# Patient Record
Sex: FEMALE | Race: WHITE | URBAN - METROPOLITAN AREA
[De-identification: names, ages, dates, MRNs, and addresses within clinical notes are randomized per-mention and may not be internally consistent; named-entity substitution may affect disease eponyms.]

---

## 2017-01-17 ENCOUNTER — OFFICE VISIT (OUTPATIENT)
Dept: FAMILY MEDICINE | Facility: CLINIC | Age: 82
End: 2017-01-17

## 2017-01-17 VITALS
HEART RATE: 72 BPM | BODY MASS INDEX: 27.48 KG/M2 | OXYGEN SATURATION: 92 % | TEMPERATURE: 97.6 F | HEIGHT: 63 IN | DIASTOLIC BLOOD PRESSURE: 84 MMHG | WEIGHT: 155.12 LBS | SYSTOLIC BLOOD PRESSURE: 135 MMHG

## 2017-01-17 DIAGNOSIS — R10.84 ABDOMINAL PAIN, GENERALIZED: Primary | ICD-10-CM

## 2017-01-17 DIAGNOSIS — M25.562 CHRONIC PAIN OF BOTH KNEES: ICD-10-CM

## 2017-01-17 DIAGNOSIS — G89.29 CHRONIC PAIN OF BOTH KNEES: ICD-10-CM

## 2017-01-17 DIAGNOSIS — M25.561 CHRONIC PAIN OF BOTH KNEES: ICD-10-CM

## 2017-01-17 RX ORDER — NAPROXEN SODIUM 220 MG
TABLET ORAL 2 TIMES DAILY WITH MEALS
COMMUNITY
End: 2017-02-05 | Stop reason: SINTOL

## 2017-01-17 NOTE — MR AVS SNAPSHOT
"              After Visit Summary   2017    Rosina Arriaza    MRN: [id number]           Patient Information     Date Of Birth          1929        Visit Information        Provider Department      2017 3:40 PM Minh Oro MD [facility]         Follow-ups after your visit        Who to contact     Please call your clinic at [phone] to:    Ask questions about your health    Make or cancel appointments    Discuss your medicines    Learn about your test results    Speak to your doctor   If you have compliments or concerns about an experience at your clinic, or if you wish to file a complaint, please contact [facility] Patient Relations at [phone] or email us at [email or web address]         Additional Information About Your Visit        MyChart Information     HazelMail is an electronic gateway that provides easy, online access to your medical records. With HazelMail, you can request a clinic appointment, read your test results, renew a prescription or communicate with your care team.     To sign up for [employer] visit the website at [email or web address]   You will be asked to enter the access code listed below, as well as some personal information. Please follow the directions to create your username and password.     Your access code is: [email or web address]  Expires: 2017  4:58 PM     Your access code will  in 90 days. If you need help or a new code, please contact your [facility] or call [phone] for assistance.        Care EveryWhere ID     This is your Care EveryWhere ID. This could be used by other organizations to access your [place] medical records  [id number]        Your Vitals Were     Pulse Temperature Height BMI (Body Mass Index) Pulse Oximetry       72 97.6  F (36.4  C) 5' 2.99\" (160 cm) 27.49 kg/m2 92%        Blood Pressure from Last 3 Encounters:   17 135/84   16 165/76 "   11/01/16 145/84    Weight from Last 3 Encounters:   01/17/17 155 lb 1.9 oz (70.362 kg)   11/06/16 159 lb 9.6 oz (72.394 kg)   11/01/16 161 lb 1.3 oz (73.065 kg)              Today, you had the following     No orders found for display       Primary Care Provider Office Phone # Fax #    Minh Oro [phone]       [address] [address]        Thank you!     Thank you for choosing [facility]  for your care. Our goal is always to provide you with excellent care. Hearing back from our patients is one way we can continue to improve our services. Please take a few minutes to complete the written survey that you may receive in the mail after your visit with us. Thank you!             Your Updated Medication List - Protect others around you: Learn how to safely use, store and throw away your medicines at www.disposemymeds.org.          This list is accurate as of: 1/17/17  4:58 PM.  Always use your most recent med list.                   Brand Name Dispense Instructions for use    acetaminophen 500 MG tablet    TYLENOL    60 tablet    Take 2 tablets (1,000 mg) by mouth 3 times daily       ALEVE 220 MG tablet   Generic drug:  naproxen sodium      Take by mouth 2 times daily (with meals)       amoxicillin 500 MG capsule    AMOXIL    8 capsule    Take 4 capsules or 2,000 mg one hour prior to dental work.       aspirin 81 MG tablet     30 tablet    Take 1 tablet (81 mg) by mouth every morning       atenolol 25 MG tablet    TENORMIN    90 tablet    Take 1 tablet (25 mg) by mouth every morning       bisacodyl 10 MG Suppository    DULCOLAX    30 suppository    Place 1 suppository (10 mg) rectally daily as needed for constipation       cholecalciferol 1000 UNITS Tabs     30 tablet    Take 1,000 Units by mouth daily       clonazePAM 0.5 MG tablet    klonoPIN    20 tablet    Take 1.5 tablets (0.75 mg) by mouth At Bedtime       donepezil 10 MG tablet    ARICEPT     30 tablet    Take 1 tablet (10 mg) by mouth daily       lactobacillus rhamnosus (GG) capsule      Take 1 capsule by mouth daily       lidocaine 5 % Patch    LIDODERM    30 patch    Place 1-2 patches onto the skin every 24 hours Apply to left hip.  Patient must be patch free for 12 hours daily.  Patch on for 12 hours/off for 12 hours       memantine XR 28 MG 24 hr capsule    NAMENDA XR     Take 1 capsule (28 mg) by mouth every morning       multivitamin, therapeutic with minerals Tabs tablet     30 each    Take 1 tablet by mouth daily       polyethylene glycol Packet    MIRALAX/GLYCOLAX    7 packet    Take 17 g by mouth daily       sennosides 8.6 MG tablet    SENOKOT     Take 2 tablets by mouth 2 times daily as needed       TRAZODONE HCL PO      Take  mg, QHS as needed.       venlafaxine 75 MG tablet    EFFEXOR    60 tablet    Take 1 tablet (75 mg) by mouth 2 times daily

## 2017-01-17 NOTE — NURSING NOTE
"87 year old  Chief Complaint   Patient presents with     Abdominal Pain     x2 days. Pain scale was 7 when it's painful.       Blood pressure 135/84, pulse 72, temperature 97.6  F (36.4  C), height 5' 2.99\" (160 cm), weight 155 lb 1.9 oz (70.362 kg), SpO2 92 %. Body mass index is 27.49 kg/(m^2).  Patient Active Problem List   Diagnosis     Breast lump     Joint pain, knee     Preventative health care     Hypertension     Memory loss     Myalgia and myositis     Intertrochanteric fracture of right femur (H)     Trochanteric fracture (H)     DVT prophylaxis     Anemia due to blood loss, acute     Candidiasis of vulva and vagina     Physical deconditioning     Spinal stenosis     Closed fracture of left inferior pubic ramus (H)     Chronic idiopathic constipation     Persistent insomnia       Wt Readings from Last 2 Encounters:   01/17/17 155 lb 1.9 oz (70.362 kg)   11/06/16 159 lb 9.6 oz (72.394 kg)     BP Readings from Last 3 Encounters:   01/17/17 135/84   11/09/16 165/76   11/01/16 145/84         Current Outpatient Prescriptions   Medication     TRAZODONE HCL PO     naproxen sodium (ALEVE) 220 MG tablet     venlafaxine (EFFEXOR) 75 MG tablet     lactobacillus rhamnosus, GG, (CULTURELL) capsule     atenolol (TENORMIN) 25 MG tablet     lidocaine (LIDODERM) 5 % patch     bisacodyl (DULCOLAX) 10 MG suppository     polyethylene glycol (MIRALAX/GLYCOLAX) packet     donepezil (ARICEPT) 10 MG tablet     memantine XR (NAMENDA XR) 28 MG capsule     multivitamin, therapeutic with minerals (THERA-VIT-M) TABS     cholecalciferol 1000 UNITS TABS     aspirin 81 MG tablet     clonazePAM (KLONOPIN) 0.5 MG tablet     acetaminophen (TYLENOL) 500 MG tablet     amoxicillin (AMOXIL) 500 MG capsule     sennosides (SENOKOT) 8.6 MG tablet     No current facility-administered medications for this visit.       Social History   Substance Use Topics     Smoking status: Former Smoker     Quit date: 01/01/1965     Smokeless tobacco: Never Used "     Alcohol Use: Yes      Comment: Occasionally; 3 half sized beers per week       Health Maintenance Due   Topic Date Due     LAILA QUESTIONNAIRE 1 YEAR  02/10/1929     PHQ-9 Q1YR (NO INBASKET)  02/10/1929     ADVANCE DIRECTIVE PLANNING Q5 YRS (NO INBASKET)  02/09/1947     FALL RISK ASSESSMENT  02/09/1994       No results found for this basename: allyson Butts CMA  January 17, 2017 3:58 PM

## 2017-01-22 ENCOUNTER — TELEPHONE (OUTPATIENT)
Dept: NURSING | Facility: CLINIC | Age: 82
End: 2017-01-22

## 2017-01-22 NOTE — TELEPHONE ENCOUNTER
Call Type: Triage Call    Presenting Problem: Daughter calling with c/o that patient was seen  on  in clinic with abdominal pain, has history of  diverticulitis. PCP did not think this was diverticulitis flaring  up, but thought they just needed to increase the Miralax for a few  days, which they did. Scheduled to have abdominal xray repeated  tomorrow morning and daughter wants to try to schedlule an  appointment with the provider as well because patient is still  haveing abdominal pain.  Triage Note:  Guideline Title: Abdominal or Pelvic Pain  Recommended Disposition: See Provider within 72 Hours  Original Inclination: Wanted to speak with a nurse  Override Disposition:  Intended Action: See /Jesse Appt  Physician Contacted: No  All other situations ?  YES  Pain described as deep, boring, or  tearing ? NO  Swallowed alkali or button battery ? NO  Signs of dehydration ? NO  Mild lower abdominal pain OR discomfort occurring mid-menstrual cycle AND lasts  less than 48 hours ? NO  Swallowed sharp object (pin, needle, piece of glass) ? NO  Following a therapeutic OR elective  ? NO  Repeated episodes of abdominal discomfort AND no previous evaluation by provider ?  NO  New or worsening signs and symptoms that may indicate shock ? NO  Pregnant AND injury to abdomen ? NO  Pregnant AND abdominal pain/cramping OR back pain ? NO  Pregnant, less than 20 weeks gestation AND vaginal bleeding ? NO  Pregnant, more than 20 weeks gestation AND vaginal bleeding ? NO  Pregnant, gestation less than 20 weeks AND signs of labor ? NO  Pregnant, 20-37 weeks gestation AND signs of labor ? NO  Pregnant, gestation more than 37 weeks AND signs of labor ? NO  Pregnant AND heartburn ? NO  Female of childbearing age having unprotected sexual intercourse or inconsistently  using birth control AND absent, delayed or unusual menstrual period or abnormal  vaginal bleeding ? NO  Pain over lower abdomen AND new painful intercourse  (dyspareunia) ? NO  Intermittent abdominal pain/discomfort associated with stress ? NO  Feeling of fullness/bloating AND recently began taking bulk-forming laxatives;  ingestion of gas producing foods or following intake of food not eaten before ?  NO  Sudden onset of pain in testicle(s), groin, or lower abdomen AND testicle(s)  swollen or tender to touch ? NO  Episodes of abdominal discomfort (feeling fullness/bloating) AND occasional nausea  or vomiting, that are increasing in frequency ? NO  Increasing menstrual or mid-cycle lower abdominal or pelvic pain ? NO  Known or suspected food poisoning and symptoms do not improve after 24 hours of  home care ? NO  New onset painless bulge, mass or swelling at an old incision, in abdominal wall,  groin or scrotum ? NO  Following ingestion of toxic or caustic substance ? NO  Pain/discomfort over bladder AND urinary tract symptoms ? NO  Diarrhea lasting longer than 72 hours AND not improving with home care ? NO  Over 50 years of age AND new onset (first episode) unbearable back or abdominal  pain ? NO  Known abdominal aneurysm (swollen or ballooning aorta) AND sudden onset of  unbearable abdominal pain ? NO  Unable to reduce diagnosed hernia AND has severe pain, nausea/vomiting or any  fever ? NO  Abdominal pain and sickle cell disease, porphyria, or diabetes ? NO  Diagnosed hernia with new or increasing bulging/swelling AND continues to bulge  even when lying down ? NO  Intermittent lower abdominal or pelvic pain occurring throughout menstrual cycle  AND not previously evaluated ? NO  Unbearable abdominal/pelvic pain ? NO  Temperature of 101.5 F (38.6 C) or greater that has not responded to 24 hours of  home care measures ? NO  Loss of appetite for 3 or more days OR nausea for 7 or more days ? NO  Generalized abdominal pain that progresses to localized pain AND any of the  following: loss of appetite, vomiting starting after pain, any fever, OR unable  to carry out normal  "activities ? NO  Abdominal pain that has steadily worsened over hours OR has been continuous for 3  hours or more AND any of the following: loss of appetite, vomiting starting after  pain, any fever, OR unable to carry out normal activities ? NO  Sudden onset or recurring abdominal pain that radiates to upper back or shoulder  AND any of the following: loss of appetite, vomiting starting after pain, any  fever, OR unable to carry out normal activities ? NO  Pain in flank, low back, over bladder, groin or perineum AND sharp pain with  urination, or felt something \"pass\" with urination, or blood in urine ? NO  New onset of 3-4 episodes vomiting or diarrhea following mild abdominal cramping ?  NO  Food or foreign body feels stuck in esophagus. ? NO  Swallowed an object (other than a button battery, sharp object, or a large object  longer than 5 cm [2 inches] long or 2 cm [3/4 inch] wide) AND no symptoms. ? NO  Foul smelling or unusual vaginal discharge (such as change in color, odor, or  amount of vaginal discharge) ? NO  Jaundice (yellowish tint to skin or whites of eyes) that is worsening or not  previously evaluated. ? NO  GI bleeding, more than streaks or scant amount of blood or passing black tarry  stool(s) ? NO  Chest discomfort associated with shortness of breath, sweating, odd heartbeats or  different heart rate, nausea, vomiting, lightheadedness, or fainting lasting 5 or  more minutes now or within the last hour ? NO  Chest pain spreading to the shoulders, neck, jaw, in one or both arms, stomach or  back lasting 5 or more minutes now or within the last hour. Pain is NOT  associated with taking a deep breath or a productive cough, movement, or touch to  a localized area. ? NO  Unintentional weight loss of more than 10% body weight in 6 months or less ? NO  Pressure, fullness, squeezing sensation or pain anywhere in the chest lasting 5 or  more minutes now or within the last hour. Pain is NOT associated with " taking a  deep breath or a productive cough, movement, or touch to a localized area on the  chest. ? NO  Recurring episodes of hiccups or an episode that can't be stopped (more than 24  hours) ? NO  Abdominal discomfort (feeling of fullness/bloating, nausea) AND has started new  prescription, nonprescription or alternative medication OR change in dose or  frequency of usual medication ? NO  New or increasing mass, lump or localized swelling in abdomen ? NO  Abdominal pain, any blood in vomitus or stool, abdominal bloating, new fever or  other cautionary symptoms began after GI surgery or procedure and is lasting  longer than defined in provider specified discharge information. ? NO  Swallowed an object longer than 2 inches (5 cm) or wider than 3/4 inch (2 cm) wide  ? NO  Colicky pain followed by episodes of vomiting over 8 hours or more. ? NO  Generalized or localized pain that becomes severe with movement, standing up  straight or coughing ? NO  Injury to abdomen or pelvis ? NO  New or worsening breathing problems that have not been evaluated OR without a  treatment plan ? NO  Recent childbirth or miscarriage ? NO  New onset constant mild or aching lower abdominal pain not improving or worsening  after 24 hours of home care                      See Provider within 24 hours ?  NO  Any temperature elevation in an immunocompromised individual OR frail elderly with  signs of dehydration ? NO  Physician Instructions:  Care Advice: Do not take aspirin, ibuprofen, ketoprofen, naproxen, etc., or  other pain relieving medications until consulting with provider.  Call provider if symptoms do not improve or symptoms worsen after trying  home care measures.  CAUTIONS  SYMPTOM / CONDITION MANAGEMENT  Consider the use of an nonprescription antacid (such as Maalox, Mylanta,  Riopan, Gelusil or Gaviscon) or a histamine antagonist (such as Tagamet,  Zantac, Pepcid or Axid) as directed by the product label or a pharmacist  and if  not contraindicated by existing conditions.  Heartburn Relief (Dietary):  - Eat smaller, more frequent meals. Eat slowly  and chew each bite thoroughly. - Drink fluids between meals rather than  with meals - Avoid high fat, spicy or gas-producing foods - Limit  liquids/foods that are gastric irritants (fruit juices, caffeinated,  carbonated or alcoholic beverages, chocolate and dairy products). - Avoid  overeating and eat at least three hours before bedtime - Maintain a healthy  weight - Take a walk after meals  Heartburn Relief (Activity and life style): - Avoid bending over at the  waist or lying flat for several hours after eating. - Consider a provider  supervised weight loss program if overweight or obese. - Avoid clothing  that is tight around the abdomen and waist. - Sleeping on stacked pillows  or raising the head of bed on 6 inch blocks may help prevent reflux. -  Avoid lifting heavy objects, straining at stool, or doing isometric  exercises - Practice stress management techniques to deal with anxiety,  worry or fear  See a provider within 8 hours if you have had repeated occurrences  of  unexplained pain in shoulders, neck, jaw, in one or both arms, stomach or  back lasting more than a few minutes that has not been previously evaluated  and  you have risk factors for cardiovascular disease.

## 2017-01-31 DIAGNOSIS — R10.84 ABDOMINAL PAIN, GENERALIZED: Primary | ICD-10-CM

## 2017-02-01 ENCOUNTER — HOSPITAL ENCOUNTER (OUTPATIENT)
Dept: CT IMAGING | Facility: CLINIC | Age: 82
Discharge: HOME | End: 2017-02-01
Attending: FAMILY MEDICINE | Admitting: FAMILY MEDICINE
Payer: MEDICARE

## 2017-02-01 ENCOUNTER — OFFICE VISIT (OUTPATIENT)
Dept: FAMILY MEDICINE | Facility: CLINIC | Age: 82
End: 2017-02-01

## 2017-02-01 VITALS
SYSTOLIC BLOOD PRESSURE: 147 MMHG | BODY MASS INDEX: 27.11 KG/M2 | OXYGEN SATURATION: 98 % | WEIGHT: 153 LBS | DIASTOLIC BLOOD PRESSURE: 78 MMHG | TEMPERATURE: 97.5 F | HEART RATE: 62 BPM

## 2017-02-01 DIAGNOSIS — R41.3 MEMORY LOSS: ICD-10-CM

## 2017-02-01 DIAGNOSIS — K27.9 PEPTIC ULCER DISEASE: Primary | ICD-10-CM

## 2017-02-01 DIAGNOSIS — R10.84 ABDOMINAL PAIN, GENERALIZED: ICD-10-CM

## 2017-02-01 LAB — HEMOGLOBIN: 8.3 G/DL (ref 11.7–15.7)

## 2017-02-01 PROCEDURE — 25000125 ZZHC RX 250: Performed by: FAMILY MEDICINE

## 2017-02-01 PROCEDURE — 25500064 ZZH RX 255 OP 636: Performed by: FAMILY MEDICINE

## 2017-02-01 PROCEDURE — 74177 CT ABD & PELVIS W/CONTRAST: CPT

## 2017-02-01 RX ORDER — IOPAMIDOL 755 MG/ML
76 INJECTION, SOLUTION INTRAVASCULAR ONCE
Status: COMPLETED | OUTPATIENT
Start: 2017-02-01 | End: 2017-02-01

## 2017-02-01 RX ORDER — CIPROFLOXACIN 250 MG/1
250 TABLET, FILM COATED ORAL 2 TIMES DAILY
COMMUNITY
End: 2017-02-14

## 2017-02-01 RX ADMIN — SODIUM CHLORIDE 59 ML: 9 INJECTION, SOLUTION INTRAVENOUS at 13:30

## 2017-02-01 RX ADMIN — IOPAMIDOL 76 ML: 755 INJECTION, SOLUTION INTRAVENOUS at 13:29

## 2017-02-01 ASSESSMENT — PAIN SCALES - GENERAL: PAINLEVEL: MILD PAIN (2)

## 2017-02-01 NOTE — MR AVS SNAPSHOT
After Visit Summary   2/1/2017    Rosina Arriaza    MRN: [id number]           Patient Information     Date Of Birth          2/9/1929        Visit Information        Provider Department      2/1/2017 3:00 PM Minh Oro MD [facility]        Today's Diagnoses     Peptic ulcer disease    -  1        Follow-ups after your visit        Future tests that were ordered for you today     Open Future Orders        Priority Expected Expires Ordered    CT Abdomen Pelvis w Contrast Routine  1/31/2018 1/31/2017            Who to contact     Please call your clinic at [phone] to:    Ask questions about your health    Make or cancel appointments    Discuss your medicines    Learn about your test results    Speak to your doctor   If you have compliments or concerns about an experience at your clinic, or if you wish to file a complaint, please contact [facility] Patient Relations at [phone] or email us at [email or web address]         Additional Information About Your Visit        MyChart Information     [employer] gives you secure access to your electronic health record. If you see a primary care provider, you can also send messages to your care team and make appointments. If you have questions, please call your primary care clinic.  If you do not have a primary care provider, please call [phone] and they will assist you.      Exhbit is an electronic gateway that provides easy, online access to your medical records. With [employer], you can request a clinic appointment, read your test results, renew a prescription or communicate with your care team.     To access your existing account, please contact your [facility] or call [phone] for assistance.        Care EveryWhere ID     This is your Care EveryWhere ID. This could be used by other organizations to access your [place] medical records  [id number]        Your  Vitals Were     Pulse Temperature Pulse Oximetry             62 97.5  F (36.4  C) (Oral) 98%          Blood Pressure from Last 3 Encounters:   02/01/17 147/78   01/17/17 135/84   11/09/16 165/76    Weight from Last 3 Encounters:   02/01/17 153 lb (69.4 kg)   01/17/17 155 lb 1.9 oz (70.362 kg)   11/06/16 159 lb 9.6 oz (72.394 kg)              We Performed the Following     Hemoglobin (HGB) ([place])        Primary Care Provider Office Phone # Fax #    Minh Oro [phone]       [address] [address]        Thank you!     Thank you for choosing [facility]  for your care. Our goal is always to provide you with excellent care. Hearing back from our patients is one way we can continue to improve our services. Please take a few minutes to complete the written survey that you may receive in the mail after your visit with us. Thank you!             Your Updated Medication List - Protect others around you: Learn how to safely use, store and throw away your medicines at www.disposemymeds.org.          This list is accurate as of: 2/1/17  3:51 PM.  Always use your most recent med list.                   Brand Name Dispense Instructions for use    acetaminophen 500 MG tablet    TYLENOL    60 tablet    Take 2 tablets (1,000 mg) by mouth 3 times daily       ALEVE 220 MG tablet   Generic drug:  naproxen sodium      Take by mouth 2 times daily (with meals)       amoxicillin 500 MG capsule    AMOXIL    8 capsule    Take 4 capsules or 2,000 mg one hour prior to dental work.       aspirin 81 MG tablet     30 tablet    Take 1 tablet (81 mg) by mouth every morning       atenolol 25 MG tablet    TENORMIN    90 tablet    Take 1 tablet (25 mg) by mouth every morning       bisacodyl 10 MG Suppository    DULCOLAX    30 suppository    Place 1 suppository (10 mg) rectally daily as needed for constipation       cholecalciferol 1000 UNITS Tabs     30 tablet    Take 1,000  Units by mouth daily       ciprofloxacin 250 MG tablet    CIPRO     Take 250 mg by mouth 2 times daily       clonazePAM 0.5 MG tablet    klonoPIN    20 tablet    Take 1.5 tablets (0.75 mg) by mouth At Bedtime       donepezil 10 MG tablet    ARICEPT    30 tablet    Take 1 tablet (10 mg) by mouth daily       lactobacillus rhamnosus (GG) capsule      Take 1 capsule by mouth daily       lidocaine 5 % Patch    LIDODERM    30 patch    Place 1-2 patches onto the skin every 24 hours Apply to left hip.  Patient must be patch free for 12 hours daily.  Patch on for 12 hours/off for 12 hours       memantine XR 28 MG 24 hr capsule    NAMENDA XR     Take 1 capsule (28 mg) by mouth every morning       multivitamin, therapeutic with minerals Tabs tablet     30 each    Take 1 tablet by mouth daily       polyethylene glycol Packet    MIRALAX/GLYCOLAX    7 packet    Take 17 g by mouth daily       sennosides 8.6 MG tablet    SENOKOT     Take 2 tablets by mouth 2 times daily as needed       TRAZODONE HCL PO      Take  mg, QHS as needed.       venlafaxine 75 MG tablet    EFFEXOR    60 tablet    Take 1 tablet (75 mg) by mouth 2 times daily

## 2017-02-01 NOTE — NURSING NOTE
Chief Complaint   Patient presents with     Gastrointestinal Problem     CT results     87 year old      Blood pressure 147/78, pulse 62, temperature 97.5  F (36.4  C), temperature source Oral, weight 153 lb (69.4 kg), SpO2 98 %. Body mass index is 27.11 kg/(m^2).    Wt Readings from Last 2 Encounters:   02/01/17 153 lb (69.4 kg)   01/17/17 155 lb 1.9 oz (70.362 kg)     BP Readings from Last 3 Encounters:   02/01/17 147/78   01/17/17 135/84   11/09/16 165/76       Patient Active Problem List   Diagnosis     Breast lump     Joint pain, knee     Preventative health care     Hypertension     Memory loss     Myalgia and myositis     Intertrochanteric fracture of right femur (H)     Trochanteric fracture (H)     DVT prophylaxis     Anemia due to blood loss, acute     Candidiasis of vulva and vagina     Physical deconditioning     Spinal stenosis     Closed fracture of left inferior pubic ramus (H)     Chronic idiopathic constipation     Persistent insomnia       Current Outpatient Prescriptions   Medication Sig Dispense Refill     ciprofloxacin (CIPRO) 250 MG tablet Take 250 mg by mouth 2 times daily       TRAZODONE HCL PO Take  mg, QHS as needed.       naproxen sodium (ALEVE) 220 MG tablet Take by mouth 2 times daily (with meals)       venlafaxine (EFFEXOR) 75 MG tablet Take 1 tablet (75 mg) by mouth 2 times daily 60 tablet      lactobacillus rhamnosus, GG, (CULTURELL) capsule Take 1 capsule by mouth daily       atenolol (TENORMIN) 25 MG tablet Take 1 tablet (25 mg) by mouth every morning 90 tablet 3     lidocaine (LIDODERM) 5 % patch Place 1-2 patches onto the skin every 24 hours Apply to left hip.  Patient must be patch free for 12 hours daily.  Patch on for 12 hours/off for 12 hours 30 patch      bisacodyl (DULCOLAX) 10 MG suppository Place 1 suppository (10 mg) rectally daily as needed for constipation 30 suppository      polyethylene glycol (MIRALAX/GLYCOLAX) packet Take 17 g by mouth daily 7 packet       donepezil (ARICEPT) 10 MG tablet Take 1 tablet (10 mg) by mouth daily 30 tablet      memantine XR (NAMENDA XR) 28 MG capsule Take 1 capsule (28 mg) by mouth every morning       multivitamin, therapeutic with minerals (THERA-VIT-M) TABS Take 1 tablet by mouth daily 30 each 0     cholecalciferol 1000 UNITS TABS Take 1,000 Units by mouth daily 30 tablet      aspirin 81 MG tablet Take 1 tablet (81 mg) by mouth every morning 30 tablet      clonazePAM (KLONOPIN) 0.5 MG tablet Take 1.5 tablets (0.75 mg) by mouth At Bedtime 20 tablet 0     acetaminophen (TYLENOL) 500 MG tablet Take 2 tablets (1,000 mg) by mouth 3 times daily 60 tablet      amoxicillin (AMOXIL) 500 MG capsule Take 4 capsules or 2,000 mg one hour prior to dental work. 8 capsule 1     sennosides (SENOKOT) 8.6 MG tablet Take 2 tablets by mouth 2 times daily as needed          Social History   Substance Use Topics     Smoking status: Former Smoker     Quit date: 01/01/1965     Smokeless tobacco: Never Used     Alcohol Use: Yes      Comment: Occasionally; 3 half sized beers per week         Health Maintenance Due   Topic Date Due     LAILA QUESTIONNAIRE 1 YEAR  02/10/1929     PHQ-9 Q1YR (NO INBASKET)  02/10/1929     ADVANCE DIRECTIVE PLANNING Q5 YRS (NO INBASKET)  02/09/1947     FALL RISK ASSESSMENT  02/09/1994       ELOY DE LA GARZA CMA  February 1, 2017 3:12 PM

## 2017-02-05 NOTE — PROGRESS NOTES
CHIEF COMPLAINT:  Followup regarding abdominal pain.      HISTORY OF PRESENT ILLNESS:  Rosina is an 87-year-old accompanied by her daughter and later, by her son, both of whom were able to spend the visit with her.      She has been having ongoing abdominal pain with some subtle weight loss.  It has been hard for her to describe the pain.  No blood has been seen in her stool.  She has had some constipation.  Currently, she is using some MiraLax once daily along with some Colace for stool softening purposes.  Given her age, and the fact that she has some memory loss which makes it hard for her describe symptoms with great accuracy and the ongoing nature of the pain, we went ahead and got a CT scan with contrast earlier today.  She has some thickening of the latter half of the stomach, the duodenum and the first portion of the jejunum.  This can be consistent with some gastritis.  Malignancy cannot be completely ruled out.      During the course of our visit, on 5 separate occasions Rosina mentioned to me exactly the same thing and a couple times, her comments were within just a couple minutes of each other.  It is clear that her memory loss is accelerating greatly.      ACTIVE MEDICAL PROBLEMS:  Briefly reviewed.      CURRENT MEDICATIONS:     1. Reviewed.  Of note, she has been taking some naproxen, typically 1 tablet twice daily to help with musculoskeletal pain.  We will stop that.     2. She has also been doing aspirin and we are going to stop that as well.      OBJECTIVE:  Rosina is in no distress.  She looks a little pale.  She is not diaphoretic.  Extremely poor short-term memory.  No abdominal exam was done today.  I pulled up the CT scan report and we went over it in detail.        LABORATORY DATA:  Hemoglobin is pending.      ASSESSMENT AND PLAN:   1. Rosina is an 87-year-old in assisted living, accompanied by both her son and daughter, with ongoing abdominal pain.  CT scan as above.  We are going to  go ahead and treat with omeprazole 20 mg 1 tablet twice daily for 14 days.  We will see if this reduces her pain.  Simultaneously, we will stop the naproxen any aspirin.   2. She looks a bit anemic.  We will see where she is at.  This could be related to the above.  If things do not improve we will discuss options such as going to GI for an upper endoscopy.  Unfortunately, I do not think that she could adequately give consent at this point.  We will certainly work with her son and daughter regarding that.   3. If she does not improve, next steps need to be discussed such as getting a biopsy.  Again, given her age, advanced memory loss, it is not clear how aggressive to be but we will discuss this with the family.  Her daughter, Natalie, we will be in touch with me through [employer] and will give me updates.  We will certainly go from there.

## 2017-02-06 ENCOUNTER — TELEPHONE (OUTPATIENT)
Dept: FAMILY MEDICINE | Facility: CLINIC | Age: 82
End: 2017-02-06

## 2017-02-06 DIAGNOSIS — D64.9 LOW HEMOGLOBIN: ICD-10-CM

## 2017-02-06 DIAGNOSIS — D64.9 LOW HEMOGLOBIN: Primary | ICD-10-CM

## 2017-02-06 LAB — HEMOCCULT STL QL IA: POSITIVE

## 2017-02-06 PROCEDURE — 82274 ASSAY TEST FOR BLOOD FECAL: CPT | Performed by: FAMILY MEDICINE

## 2017-02-06 NOTE — TELEPHONE ENCOUNTER
"Received call from pt's daughter, Natalie - states pt had episode this AM - she was sitting on shower chair, slumped forward and appeared to lose consciousness for less than 5 seconds - this was reported to raymond by 24 hr HHA who was present. States she eased her to the floor - pt became alert and awake. HHA called for EMS help to get pt in bed. Pt was able to walk with assit to bed. She is alert, has poor appetite, fatigued. Pt with hgb 8.3 recently in clinic - has been taking prilosec bid with some success in lessening epigastric and abd pain. Raymond wondering what to do - she is heading over to pt's home.  Discussed episode with Dr Oro - he would like raymond to  FIT card to see if pt losing blood in stool. Also would like pt drink plenty of fluids, and to hold atenolol for now. If FIT card positive, he would want to discuss health care directives with family. Raymond states she has health care directive, she will bring in copy - she does not think it is in our chart, as it \"is several years old\". If she has another episode of loss of consciousness, she should go to ER for eval.  Discussed all of this with raymond - she agrees with plan.  She will call back prn concerns.  "

## 2017-02-11 NOTE — PROGRESS NOTES
"CHIEF COMPLAINT:  Two days of abdominal pain.      HISTORY OF PRESENT ILLNESS:  Rosina is an 88-year-old who is living in an assisted living facility and is accompanied by her daughter, Natalie, to today's visit.      She has a history with her bowels.  This has been going on for a while now.  She had tried some Culturelle and All-Bran and MiraLax.  Occasionally, she has some right lower quadrant pain.  Occasionally, she indicates that she feels \"constipated\".  She is getting some home health care and has been getting some help going to the bathroom and it has observed that she is having a daily bowel movement.      Two days ago, on Sunday, 01/15, around midnight she stated that she was quite constipated.  She developed some significant left lower quadrant pain and had some gas.  She asked for some meds and states that she had a real \"lousy night\" that evening.  By the next day (yesterday) she was feeling \"okay.\"  She had pineapple for a snack.  Then she developed some right lower quadrant pain.  She belched and felt better.  She has had some burning pain at times.  She has a history of diverticulosis.  She has not had any kind of imaging at this point.      ACTIVE MEDICAL PROBLEMS:  Reviewed.      CURRENT MEDICATIONS:     1. Reviewed.        Her geriatric psychiatrist, Dr. Godoy, has her on trazodone at night and venlafaxine (Effexor) 75 mg daily.  She is on donepezil and Namenda for her memory loss.  As an aside, her daughter mentions that her knee pain has flared at times but it seems like the abdominal pain has taken over.  We have done some intra-articular cortisone injections and it sounds like she gets some relief for a while.      OBJECTIVE:  Rosina is in no distress.  She is not diaphoretic.  Good eye contact.  It is clear that she has some memory loss but is conversant and very pleasant.  /84 with a pulse of 72.  Temperature is 97.6.  She is 5 feet 3 in height and weighs 155 with a BMI of 27.49.  O2 " sats are 92% on room air.  Her skin is warm and dry and pink, and I suspect her oxygen level is actually better than that.  Abdomen is not distended, nontender.  Bowel sounds are heard throughout.      ASSESSMENT AND PLAN:   1. Significant abdominal concerns including pain at times and concerns about constipation, although she has been observed having a bowel movement daily.  I am afraid that her memory loss is probably playing into this a little bit.   2. It is very possible that she is significantly constipated or has significant stool burden in her abdomen.  I will get a 2-view x-ray of her abdomen at their convenience.   3. Ongoing knee pain.  Before we do anymore intra-articular cortisone injections.  I would like to do an AP standing knee film to assess how much loss of joint space there is.  Her daughter, Natalie, is in complete agreement with the above plan.   4. Call with any problems or questions.  I will notify them when I get the results and we will proceed from there.

## 2017-02-14 ENCOUNTER — OFFICE VISIT (OUTPATIENT)
Dept: FAMILY MEDICINE | Facility: CLINIC | Age: 82
End: 2017-02-14

## 2017-02-14 VITALS
OXYGEN SATURATION: 96 % | DIASTOLIC BLOOD PRESSURE: 89 MMHG | BODY MASS INDEX: 26.58 KG/M2 | TEMPERATURE: 97.7 F | SYSTOLIC BLOOD PRESSURE: 145 MMHG | WEIGHT: 150 LBS | HEART RATE: 106 BPM

## 2017-02-14 DIAGNOSIS — R41.3 MEMORY LOSS: ICD-10-CM

## 2017-02-14 DIAGNOSIS — K29.00 ACUTE GASTRITIS, PRESENCE OF BLEEDING UNSPECIFIED, UNSPECIFIED GASTRITIS TYPE: Primary | ICD-10-CM

## 2017-02-14 DIAGNOSIS — D62 ANEMIA DUE TO BLOOD LOSS, ACUTE: ICD-10-CM

## 2017-02-14 LAB — HEMOGLOBIN: 9.5 G/DL (ref 11.7–15.7)

## 2017-02-14 ASSESSMENT — PAIN SCALES - GENERAL: PAINLEVEL: NO PAIN (0)

## 2017-02-14 NOTE — MR AVS SNAPSHOT
After Visit Summary   2/14/2017    Rosina Arriaza    MRN: [id number]           Patient Information     Date Of Birth          2/9/1929        Visit Information        Provider Department      2/14/2017 3:40 PM Minh Oro MD [facility]        Today's Diagnoses     Anemia due to blood loss, acute    -  1       Follow-ups after your visit        Who to contact     Please call your clinic at [phone] to:    Ask questions about your health    Make or cancel appointments    Discuss your medicines    Learn about your test results    Speak to your doctor   If you have compliments or concerns about an experience at your clinic, or if you wish to file a complaint, please contact [facility] Patient Relations at [phone] or email us at [email or web address]         Additional Information About Your Visit        MyChart Information     [employer] gives you secure access to your electronic health record. If you see a primary care provider, you can also send messages to your care team and make appointments. If you have questions, please call your primary care clinic.  If you do not have a primary care provider, please call [phone] and they will assist you.      [employer] is an electronic gateway that provides easy, online access to your medical records. With [employer], you can request a clinic appointment, read your test results, renew a prescription or communicate with your care team.     To access your existing account, please contact your [facility] or call [phone] for assistance.        Care EveryWhere ID     This is your Care EveryWhere ID. This could be used by other organizations to access your [place] medical records  [id number]        Your Vitals Were     Pulse Temperature Pulse Oximetry BMI (Body Mass Index)          106 97.7  F (36.5  C) (Oral) 96% 26.58 kg/m2         Blood Pressure from Last 3 Encounters:    02/14/17 145/89   02/01/17 147/78   01/17/17 135/84    Weight from Last 3 Encounters:   02/14/17 150 lb (68 kg)   02/01/17 153 lb (69.4 kg)   01/17/17 155 lb 1.9 oz (70.4 kg)              We Performed the Following     Hemoglobin (HGB) ([place])          Today's Medication Changes          These changes are accurate as of: 2/14/17  4:10 PM.  If you have any questions, ask your nurse or doctor.               Stop taking these medicines if you haven't already. Please contact your care team if you have questions.     amoxicillin 500 MG capsule   Commonly known as:  AMOXIL   Stopped by:  Minh Oro MD           atenolol 25 MG tablet   Commonly known as:  TENORMIN   Stopped by:  Minh Oro MD           ciprofloxacin 250 MG tablet   Commonly known as:  CIPRO   Stopped by:  Minh Oro MD                    Primary Care Provider Office Phone # Fax #    Minh Oro -[phone]       [place] [address]        Thank you!     Thank you for choosing [facility]  for your care. Our goal is always to provide you with excellent care. Hearing back from our patients is one way we can continue to improve our services. Please take a few minutes to complete the written survey that you may receive in the mail after your visit with us. Thank you!             Your Updated Medication List - Protect others around you: Learn how to safely use, store and throw away your medicines at www.disposemymeds.org.          This list is accurate as of: 2/14/17  4:10 PM.  Always use your most recent med list.                   Brand Name Dispense Instructions for use    acetaminophen 500 MG tablet    TYLENOL    60 tablet    Take 2 tablets (1,000 mg) by mouth 3 times daily       bisacodyl 10 MG Suppository    DULCOLAX    30 suppository    Place 1 suppository (10 mg) rectally daily as needed for constipation       cholecalciferol 1000 UNITS Tabs     30  tablet    Take 1,000 Units by mouth daily       clonazePAM 0.5 MG tablet    klonoPIN    20 tablet    Take 1.5 tablets (0.75 mg) by mouth At Bedtime       donepezil 10 MG tablet    ARICEPT    30 tablet    Take 1 tablet (10 mg) by mouth daily       lactobacillus rhamnosus (GG) capsule      Take 1 capsule by mouth daily       lidocaine 5 % Patch    LIDODERM    30 patch    Place 1-2 patches onto the skin every 24 hours Apply to left hip.  Patient must be patch free for 12 hours daily.  Patch on for 12 hours/off for 12 hours       memantine XR 28 MG 24 hr capsule    NAMENDA XR     Take 1 capsule (28 mg) by mouth every morning       multivitamin, therapeutic with minerals Tabs tablet     30 each    Take 1 tablet by mouth daily       polyethylene glycol Packet    MIRALAX/GLYCOLAX    7 packet    Take 17 g by mouth daily       sennosides 8.6 MG tablet    SENOKOT     Take 2 tablets by mouth 2 times daily as needed       TRAZODONE HCL PO      Take  mg, QHS as needed.       venlafaxine 75 MG tablet    EFFEXOR    60 tablet    Take 1 tablet (75 mg) by mouth 2 times daily

## 2017-02-14 NOTE — NURSING NOTE
Chief Complaint   Patient presents with     Follow Up For     low hemoglobin     88 year old      Blood pressure 145/89, pulse 106, temperature 97.7  F (36.5  C), temperature source Oral, weight 150 lb (68 kg), SpO2 96 %. Body mass index is 26.58 kg/(m^2).    Wt Readings from Last 2 Encounters:   02/14/17 150 lb (68 kg)   02/01/17 153 lb (69.4 kg)     BP Readings from Last 3 Encounters:   02/14/17 145/89   02/01/17 147/78   01/17/17 135/84       Patient Active Problem List   Diagnosis     Breast lump     Joint pain, knee     Preventative health care     Hypertension     Memory loss     Myalgia and myositis     Intertrochanteric fracture of right femur (H)     Trochanteric fracture (H)     DVT prophylaxis     Anemia due to blood loss, acute     Candidiasis of vulva and vagina     Physical deconditioning     Spinal stenosis     Closed fracture of left inferior pubic ramus (H)     Chronic idiopathic constipation     Persistent insomnia       Current Outpatient Prescriptions   Medication Sig Dispense Refill     TRAZODONE HCL PO Take  mg, QHS as needed.       venlafaxine (EFFEXOR) 75 MG tablet Take 1 tablet (75 mg) by mouth 2 times daily 60 tablet      lactobacillus rhamnosus, GG, (CULTURELL) capsule Take 1 capsule by mouth daily       lidocaine (LIDODERM) 5 % patch Place 1-2 patches onto the skin every 24 hours Apply to left hip.  Patient must be patch free for 12 hours daily.  Patch on for 12 hours/off for 12 hours 30 patch      bisacodyl (DULCOLAX) 10 MG suppository Place 1 suppository (10 mg) rectally daily as needed for constipation 30 suppository      polyethylene glycol (MIRALAX/GLYCOLAX) packet Take 17 g by mouth daily 7 packet      donepezil (ARICEPT) 10 MG tablet Take 1 tablet (10 mg) by mouth daily 30 tablet      memantine XR (NAMENDA XR) 28 MG capsule Take 1 capsule (28 mg) by mouth every morning       multivitamin, therapeutic with minerals (THERA-VIT-M) TABS Take 1 tablet by mouth daily 30 each 0      cholecalciferol 1000 UNITS TABS Take 1,000 Units by mouth daily 30 tablet      clonazePAM (KLONOPIN) 0.5 MG tablet Take 1.5 tablets (0.75 mg) by mouth At Bedtime 20 tablet 0     acetaminophen (TYLENOL) 500 MG tablet Take 2 tablets (1,000 mg) by mouth 3 times daily 60 tablet      sennosides (SENOKOT) 8.6 MG tablet Take 2 tablets by mouth 2 times daily as needed          Social History   Substance Use Topics     Smoking status: Former Smoker     Quit date: 1/1/1965     Smokeless tobacco: Never Used     Alcohol use Yes      Comment: Occasionally; 3 half sized beers per week         Health Maintenance Due   Topic Date Due     LAILA QUESTIONNAIRE 1 YEAR  02/10/1929     PHQ-9 Q1YR (NO INBASKET)  02/10/1929     ADVANCE DIRECTIVE PLANNING Q5 YRS (NO INBASKET)  02/09/1947     FALL RISK ASSESSMENT  02/09/1994       ELOY DE LA GARZA CMA  February 14, 2017 3:44 PM

## 2017-02-20 NOTE — PROGRESS NOTES
CHIEF COMPLAINT:  Followup for anemia.      HISTORY OF PRESENT ILLNESS:  Rosina is an 88-year-old accompanied by her daughter, Natalie, to today's visit.  Her son, Dar, could not be here in person, but he was on speaker phone on my phone.      I asked Rosina to come back approximately 2 weeks after having a good conversation with the 3 of them about her situation.  She was having a great deal of abdominal pain which was difficult for her to identify.  CT scan revealed some changes in the distal stomach and proximal duodenum.  It was suspicious for gastritis, but cancer could not be ruled out.  Therefore, she stopped taking naproxen and aspirin, started taking omeprazole 20 mg twice daily and they have been very consistent with this.      Of note, her FIT card on 2/06 was positive for blood.      I am very happy to report that her abdominal pain is completely resolved.  Her appetite has improved.  Her memory seems to have improved.  She is feeling so much better.  Both family members echo that.  Indeed, her hemoglobin on 02/01 was 8.3 and today it is up to 9.5.  I think this is an excellent sign.      ACTIVE MEDICAL PROBLEMS/CURRENT MEDICATIONS:  Reviewed.      OBJECTIVE:  Rosina is in no distress.  Whereas last time, she mentioned that she was having abdominal pain at least 5 times during the course of our visit and did not have any memory of the previous time that she asked, that simply did not happen this time.  She seemed much more sharp.  She was lucid.  She was answering questions appropriately.  /89 with a pulse of 106.  Temperature is 97.7.  She weighs 150 pounds, down 3 pounds from 2 weeks ago.  O2 sats are 96% on room air.  BMI 26.58.  Her color looks a little bit better as well.  She was looking quite pale last time.      ASSESSMENT AND PLAN:   1.  Probable gastritis with bleeding.  Likely exacerbated by the fact that she was taking daily baby aspirin and then using naproxen for pain.  She  stopped using both of those medications and we put her on a fairly high-dose PPI medication.   2.  I am going to have her continue with omeprazole 20 mg once daily (rather than twice daily) for the next month.  I would like to see her back in 1 more month.  At that time, we will check a hemoglobin again.  If it continues to rise, we will know we are in the right direction.  We can check another FIT card at some point, and I suspect that will be negative for any blood.  At age 88, in the context of some memory loss, I would like to keep interventional procedures down to a minimum.  Family agrees.  Call with any problems or questions.

## 2017-03-16 DIAGNOSIS — R32 URINARY INCONTINENCE: Primary | ICD-10-CM

## 2017-03-16 RX ORDER — SOLIFENACIN SUCCINATE 5 MG/1
5 TABLET, FILM COATED ORAL DAILY
Qty: 30 TABLET | Refills: 1 | Status: SHIPPED | OUTPATIENT
Start: 2017-03-16 | End: 2017-05-16 | Stop reason: DRUGHIGH

## 2017-04-11 ENCOUNTER — OFFICE VISIT (OUTPATIENT)
Dept: FAMILY MEDICINE | Facility: CLINIC | Age: 82
End: 2017-04-11

## 2017-04-11 VITALS
DIASTOLIC BLOOD PRESSURE: 88 MMHG | HEIGHT: 63 IN | BODY MASS INDEX: 26.23 KG/M2 | OXYGEN SATURATION: 94 % | SYSTOLIC BLOOD PRESSURE: 151 MMHG | WEIGHT: 148.04 LBS | HEART RATE: 93 BPM

## 2017-04-11 DIAGNOSIS — D62 ANEMIA DUE TO BLOOD LOSS, ACUTE: ICD-10-CM

## 2017-04-11 DIAGNOSIS — N32.89 BLADDER SPASMS: ICD-10-CM

## 2017-04-11 DIAGNOSIS — R41.3 MEMORY LOSS: Primary | ICD-10-CM

## 2017-04-11 LAB
ERYTHROCYTE [DISTWIDTH] IN BLOOD BY AUTOMATED COUNT: 14.8 % (ref 10–15)
HCT VFR BLD AUTO: 33.6 % (ref 35–47)
HEMOGLOBIN: 8.6 G/DL (ref 11.7–15.7)
HGB BLD-MCNC: 10.1 G/DL (ref 11.7–15.7)
MCH RBC QN AUTO: 27.8 PG (ref 26.5–33)
MCHC RBC AUTO-ENTMCNC: 30.1 G/DL (ref 31.5–36.5)
MCV RBC AUTO: 93 FL (ref 78–100)
PLATELET # BLD AUTO: 309 10E9/L (ref 150–450)
RBC # BLD AUTO: 3.63 10E12/L (ref 3.8–5.2)
WBC # BLD AUTO: 8.2 10E9/L (ref 4–11)

## 2017-04-11 RX ORDER — SOLIFENACIN SUCCINATE 10 MG/1
10 TABLET, FILM COATED ORAL DAILY
Qty: 30 TABLET | Refills: 4 | Status: SHIPPED | OUTPATIENT
Start: 2017-04-11 | End: 2017-05-16

## 2017-04-11 ASSESSMENT — PAIN SCALES - GENERAL: PAINLEVEL: NO PAIN (0)

## 2017-04-11 NOTE — MR AVS SNAPSHOT
After Visit Summary   4/11/2017    Rosina Arriaza    MRN: [id number]           Patient Information     Date Of Birth          2/9/1929        Visit Information        Provider Department      4/11/2017 3:40 PM Minh Oro MD [facility]        Today's Diagnoses     Memory loss    -  1    Anemia due to blood loss, acute        Bladder spasms           Follow-ups after your visit        Follow-up notes from your care team     Return if symptoms worsen or fail to improve.      Who to contact     Please call your clinic at [phone] to:    Ask questions about your health    Make or cancel appointments    Discuss your medicines    Learn about your test results    Speak to your doctor   If you have compliments or concerns about an experience at your clinic, or if you wish to file a complaint, please contact [facility] Patient Relations at [phone] or email us at [email or web address]         Additional Information About Your Visit        MyChart Information     [email or web address] gives you secure access to your electronic health record. If you see a primary care provider, you can also send messages to your care team and make appointments. If you have questions, please call your primary care clinic.  If you do not have a primary care provider, please call [phone] and they will assist you.      [employer] is an electronic gateway that provides easy, online access to your medical records. With [employer], you can request a clinic appointment, read your test results, renew a prescription or communicate with your care team.     To access your existing account, please contact your [facility] or call [phone] for assistance.        Care EveryWhere ID     This is your Care EveryWhere ID. This could be used by other organizations to access your [facility] medical records  [id number]        Your Vitals Were     Pulse Height Pulse  "Oximetry BMI (Body Mass Index)          93 5' 2.99\" (160 cm) 94% 26.23 kg/m2         Blood Pressure from Last 3 Encounters:   04/11/17 151/88   02/14/17 145/89   02/01/17 147/78    Weight from Last 3 Encounters:   04/11/17 148 lb 0.6 oz (67.2 kg)   02/14/17 150 lb (68 kg)   02/01/17 153 lb (69.4 kg)              We Performed the Following     CBC with platelets     Ferritin     Hemoglobin (HGB) ([place])     Iron and iron binding capacity          Today's Medication Changes          These changes are accurate as of: 4/11/17 11:59 PM.  If you have any questions, ask your nurse or doctor.               These medicines have changed or have updated prescriptions.        Dose/Directions    lidocaine 5 % Patch   Commonly known as:  LIDODERM   This may have changed:    - when to take this  - reasons to take this  - additional instructions   Used for:  Pubic ramus fracture, left, closed, initial encounter (H)        Dose:  1-2 patch   Place 1-2 patches onto the skin every 24 hours Apply to left hip.  Patient must be patch free for 12 hours daily.  Patch on for 12 hours/off for 12 hours   Quantity:  30 patch   Refills:  0       * solifenacin 5 MG tablet   Commonly known as:  VESICARE   This may have changed:  how much to take   Used for:  Urinary incontinence   Changed by:  Minh Oro MD        Dose:  5 mg   Take 1 tablet (5 mg) by mouth daily   Quantity:  30 tablet   Refills:  1       * solifenacin 10 MG tablet   Commonly known as:  VESICARE   This may have changed:  You were already taking a medication with the same name, and this prescription was added. Make sure you understand how and when to take each.   Used for:  Bladder spasms   Changed by:  Minh Oro MD        Dose:  10 mg   Take 1 tablet (10 mg) by mouth daily   Quantity:  30 tablet   Refills:  4       * Notice:  This list has 2 medication(s) that are the same as other medications prescribed for you. Read the directions carefully, and ask " your doctor or other care provider to review them with you.         Where to get your medicines      These medications were sent to [facility] [address] [facility] [address] [place] [address] AT [facility]  [address] [facility] [address]     Phone:  [phone]     solifenacin 10 MG tablet                Primary Care Provider Office Phone # Fax #    Minh Oro [phone]       [facility] [address]  [facility] [address]        Thank you!     Thank you for choosing [facility]  for your care. Our goal is always to provide you with excellent care. Hearing back from our patients is one way we can continue to improve our services. Please take a few minutes to complete the written survey that you may receive in the mail after your visit with us. Thank you!             Your Updated Medication List - Protect others around you: Learn how to safely use, store and throw away your medicines at www.disposemymeds.org.          This list is accurate as of: 4/11/17 11:59 PM.  Always use your most recent med list.                   Brand Name Dispense Instructions for use    acetaminophen 500 MG tablet    TYLENOL    60 tablet    Take 2 tablets (1,000 mg) by mouth 3 times daily       cholecalciferol 1000 UNITS Tabs     30 tablet    Take 1,000 Units by mouth daily       clonazePAM 0.5 MG tablet    klonoPIN    20 tablet    Take 1.5 tablets (0.75 mg) by mouth At Bedtime       COLACE PO      Take 100 mg by mouth       donepezil 10 MG tablet    ARICEPT    30 tablet    Take 1 tablet (10 mg) by mouth daily       lactobacillus rhamnosus (GG) capsule      Take 1 capsule by mouth daily       lidocaine 5 % Patch    LIDODERM    30 patch    Place 1-2 patches onto the skin every 24 hours Apply to left hip.  Patient must be patch free for 12 hours daily.  Patch on for 12 hours/off for 12 hours       memantine XR 28 MG 24 hr capsule    NAMENDA XR     Take 1 capsule (28 mg) by mouth every  morning       multivitamin, therapeutic with minerals Tabs tablet     30 each    Take 1 tablet by mouth daily       polyethylene glycol Packet    MIRALAX/GLYCOLAX    7 packet    Take 17 g by mouth daily       * solifenacin 5 MG tablet    VESICARE    30 tablet    Take 1 tablet (5 mg) by mouth daily       * solifenacin 10 MG tablet    VESICARE    30 tablet    Take 1 tablet (10 mg) by mouth daily       TRAZODONE HCL PO      Take  mg, QHS as needed.       venlafaxine 75 MG tablet    EFFEXOR    60 tablet    Take 1 tablet (75 mg) by mouth 2 times daily       * Notice:  This list has 2 medication(s) that are the same as other medications prescribed for you. Read the directions carefully, and ask your doctor or other care provider to review them with you.

## 2017-04-11 NOTE — NURSING NOTE
"88 year old  Chief Complaint   Patient presents with     Follow Up For     a new medication and using walker.     Blood Draw     check Hgb.     Medication Request       Blood pressure 151/88, pulse 93, height 5' 2.99\" (160 cm), weight 148 lb 0.6 oz (67.2 kg), SpO2 94 %. Body mass index is 26.23 kg/(m^2).  Patient Active Problem List   Diagnosis     Breast lump     Joint pain, knee     Preventative health care     Hypertension     Memory loss     Myalgia and myositis     Intertrochanteric fracture of right femur (H)     Trochanteric fracture (H)     DVT prophylaxis     Anemia due to blood loss, acute     Candidiasis of vulva and vagina     Physical deconditioning     Spinal stenosis     Closed fracture of left inferior pubic ramus (H)     Chronic idiopathic constipation     Persistent insomnia       Wt Readings from Last 2 Encounters:   04/11/17 148 lb 0.6 oz (67.2 kg)   02/14/17 150 lb (68 kg)     BP Readings from Last 3 Encounters:   04/11/17 151/88   02/14/17 145/89   02/01/17 147/78         Current Outpatient Prescriptions   Medication     Docusate Sodium (COLACE PO)     solifenacin (VESICARE) 5 MG tablet     TRAZODONE HCL PO     venlafaxine (EFFEXOR) 75 MG tablet     lactobacillus rhamnosus, GG, (CULTURELL) capsule     lidocaine (LIDODERM) 5 % patch     polyethylene glycol (MIRALAX/GLYCOLAX) packet     donepezil (ARICEPT) 10 MG tablet     memantine XR (NAMENDA XR) 28 MG capsule     multivitamin, therapeutic with minerals (THERA-VIT-M) TABS     cholecalciferol 1000 UNITS TABS     clonazePAM (KLONOPIN) 0.5 MG tablet     acetaminophen (TYLENOL) 500 MG tablet     No current facility-administered medications for this visit.        Social History   Substance Use Topics     Smoking status: Former Smoker     Quit date: 1/1/1965     Smokeless tobacco: Never Used     Alcohol use Yes      Comment: Occasionally; 3 half sized beers per week       Health Maintenance Due   Topic Date Due     LAILA QUESTIONNAIRE 1 YEAR  " 02/10/1929     PHQ-9 Q1YR (NO INBASKET)  02/10/1929     ADVANCE DIRECTIVE PLANNING Q5 YRS (NO INBASKET)  02/09/1947       No results found for: GYPSY Butts CMA  April 11, 2017 3:45 PM

## 2017-04-11 NOTE — PROGRESS NOTES
CHIEF COMPLAINT:  Followup regarding blood loss, pelvic fracture and gastritis.      HISTORY OF PRESENT ILLNESS:  Rosina is an 88-year-old accompanied by both her daughter, Natalie, and son, Dar.  I have been in frequent contact with Natalie through [employer].      Her mom has been waking up in the night to void, up to 4 times a night, and this has been documented by the aide who is with her.  Therefore, Natalie checked with both her mom's psychiatrist (Dr. Godoy) and me about possibly adjusting her trazodone and then we started looking at other antispasmodic medications.  Ultimately, we settled on VESIcare.  She has been doing 5 mg at night and it clearly seemed to help.  At one point a little over a week ago, she had some hallucinations.  She has not had any subsequent hallucinations.  In addition, we have even increased her medication from 5 mg to 10 mg at night and she has been doing this now for the last 4 nights.  No hallucinations have occurred.  Though Rosina is not that conscious of how often she is getting up or not, it has been reported that she is doing quite well with fewer nighttime risings to void.  This is certainly reassuring.      She continues to be pain free.  No epigastric discomfort.  She truly has not complained about anything.  She has been on omeprazole 20 mg daily for almost 2 months now.  Her last pill ran out yesterday.  She has had no black tarry stools or blood in the stool.  Truly no complaints of pain.      She herself brings up the fact that her memory has not been as good as it had been and this has been going on for at least 2 months.  Her daughter, Natalie, mentioned to me later through a phone call that her mom has said that periodically over the years.  Clearly, her memory loss waxes and wanes.      ACTIVE MEDICAL PROBLEMS:  Reviewed.      CURRENT MEDICATIONS:  Briefly reviewed.      OBJECTIVE:  Rosina is in absolutely no distress.  Her color looks quite good.   She is upbeat and was  certainly alert and oriented to person and place.  Blood pressure is a bit high at 151/88.  This is consistent with what it has been before.  Pulse 93 and regular.  She is 5 feet 3 inches and weighs 148 with just a 2 pound weight loss in the last 2 months.  O2 sats are 94% on room air.  BMI 26.23.  Hemoglobin was obtained and in the office it came back at 8.6.  This is somewhat disappointing as it had gone from 8.3 in early February to 9.5 two weeks later.  Unfortunately, we realized later that this was a point of care error as I sent out a CBC and that showed that her hemoglobin was actually 10.1.  Nevertheless, I am still checking iron studies and a ferritin level.      ASSESSMENT AND PLAN:   1.  History of bladder spasms, markedly improved on VESIcare.  Doing well with 10 mg nightly.  Just one single episode of hallucinations occurring.  This can be a side effect of the medication, but given that this just happened once, we will stay the course with all of this.   2.  History of significant abdominal discomfort, now completely resolved having taken omeprazole 20 mg once daily for nearly 2 months.   3.  History of anemia, likely secondary to gastric bleed.  That has completely resolved going from a low of 8.3 on 02/01/2017 up to 10.1 today.  Hopefully, this will continue to rise with time.   4.  Ongoing history of memory loss, somewhat unchanged.   5.  We had a good conversation about the use of her walker.  She had gone to physical therapy, and strengthened things, then fell and broke her pelvis.  She recovered and is using a walker.  She really hates using it but we all suggested how important it would be for her to continue to use it.  Reluctantly, she agrees to do so.  Call with any problems or questions.  I look forward to seeing her back in approximately 2-3 months.      Total time spent with Natalie Almaguer and Dar over 25 minutes, more than 50% of that time was spent in care coordination and counseling.

## 2017-04-12 LAB — FERRITIN SERPL-MCNC: 15 NG/ML (ref 8–252)

## 2017-04-13 LAB
IRON SATN MFR SERPL: NORMAL % (ref 15–50)
IRON SERPL-MCNC: NORMAL UG/DL (ref 35–180)
TIBC SERPL-MCNC: NORMAL UG/DL (ref 240–430)

## 2017-05-16 DIAGNOSIS — N32.89 BLADDER SPASMS: ICD-10-CM

## 2017-05-16 RX ORDER — SOLIFENACIN SUCCINATE 10 MG/1
10 TABLET, FILM COATED ORAL DAILY
Qty: 90 TABLET | Refills: 3 | Status: SHIPPED | OUTPATIENT
Start: 2017-05-16

## 2017-05-19 ENCOUNTER — DOCUMENTATION ONLY (OUTPATIENT)
Dept: OTHER | Facility: CLINIC | Age: 82
End: 2017-05-19

## 2017-05-19 DIAGNOSIS — Z71.89 ADVANCED DIRECTIVES, COUNSELING/DISCUSSION: Chronic | ICD-10-CM

## 2017-06-04 ENCOUNTER — NURSE TRIAGE (OUTPATIENT)
Dept: NURSING | Facility: CLINIC | Age: 82
End: 2017-06-04

## 2017-06-04 NOTE — TELEPHONE ENCOUNTER
Reason for Disposition    Requesting regular office appointment    Additional Information    Negative: [1] Caller is not with the adult (patient) AND [2] reporting urgent symptoms    Negative: Lab result questions    Negative: Medication questions    Negative: Caller cannot be reached by phone    Negative: Caller has already spoken to PCP or another triager    Negative: RN needs further essential information from caller in order to complete triage    Protocols used: INFORMATION ONLY [facility]

## 2017-06-20 ENCOUNTER — OFFICE VISIT (OUTPATIENT)
Dept: FAMILY MEDICINE | Facility: CLINIC | Age: 82
End: 2017-06-20

## 2017-06-20 VITALS
DIASTOLIC BLOOD PRESSURE: 86 MMHG | TEMPERATURE: 97.8 F | HEART RATE: 85 BPM | SYSTOLIC BLOOD PRESSURE: 163 MMHG | OXYGEN SATURATION: 94 %

## 2017-06-20 DIAGNOSIS — S80.811A ABRASION, RIGHT LOWER LEG, INITIAL ENCOUNTER: Primary | ICD-10-CM

## 2017-06-20 DIAGNOSIS — S00.83XA CONTUSION OF FACE, SCALP AND NECK, INITIAL ENCOUNTER: ICD-10-CM
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
## 2017-06-20 DIAGNOSIS — S00.03XA CONTUSION OF FACE, SCALP AND NECK, INITIAL ENCOUNTER: ICD-10-CM

## 2017-06-20 DIAGNOSIS — S10.93XA CONTUSION OF FACE, SCALP AND NECK, INITIAL ENCOUNTER: ICD-10-CM

## 2017-06-20 DIAGNOSIS — S80.11XA TRAUMATIC ECCHYMOSIS OF RIGHT LOWER LEG, INITIAL ENCOUNTER: ICD-10-CM

## 2017-06-20 ASSESSMENT — PAIN SCALES - GENERAL: PAINLEVEL: SEVERE PAIN (6)

## 2017-06-20 NOTE — PROGRESS NOTES
SUBJECTIVE:                                                    Rosina Arriaza is a 88 year old female who presents to clinic today for the following health issues:      Fell at her home early this morning injuring her right lower anterior shin and below right eye. Care taker was present.  No LOC occured. RAMIRO:  Got out of bed which has an alarm on it and when using her walker tripped and fell. Rosina lives in her own home and has 24 hour aid and is visited by home care RN. She struggles with memory issues.  She has a history of previous fall.    She complains of pain right lower leg and below right eye. She denies headache, blurred vision and has no active bleeding of the large abrasion on her right shin. Appetite is OK and she is ambulating normally.     Problem list and histories reviewed & adjusted, as indicated.  Additional history: as documented    Patient Active Problem List   Diagnosis     Breast lump     Joint pain, knee     Preventative health care     Hypertension     Memory loss     Myalgia and myositis     Intertrochanteric fracture of right femur (H)     Trochanteric fracture (H)     DVT prophylaxis     Anemia due to blood loss, acute     Candidiasis of vulva and vagina     Physical deconditioning     Spinal stenosis     Closed fracture of left inferior pubic ramus (H)     Chronic idiopathic constipation     Persistent insomnia     Advance Care Planning     Past Surgical History:   Procedure Laterality Date     ARTHROPLASTY HIP Right 12/18/2014    Procedure: ARTHROPLASTY HIP;  Surgeon: Tej March MD;  Location:  OR       Social History   Substance Use Topics     Smoking status: Former Smoker     Quit date: 1/1/1965     Smokeless tobacco: Never Used     Alcohol use Yes      Comment: Occasionally; 3 half sized beers per week     Family History   Problem Relation Age of Onset     Cancer - colorectal Mother            Reviewed and updated as needed this visit by clinical staff  Tobacco   Allergies  Meds  Problems       Reviewed and updated as needed this visit by Provider  Allergies  Meds  Problems         ROS:  Constitutional, HEENT, cardiovascular, pulmonary, gi and gu systems are negative, except as otherwise noted.    OBJECTIVE:                                                    /86  Pulse 85  Temp 97.8  F (36.6  C) (Oral)  SpO2 94%  There is no height or weight on file to calculate BMI.  GENERAL: healthy, alert and no distress  Eyes:  Vision grossly intact. PERRLA,EOMI. Small amount of swelling and bruising below and lateral to the right eye. Minimal palpable tenderness.  No abrasion.  NECK: no adenopathy, no asymmetry, masses, or scars and thyroid normal to palpation  RESP: lungs clear to auscultation - no rales, rhonchi or wheezes  CV: regular rate and rhythm, normal S1 S2, no S3 or S4, no murmur, click or rub, no peripheral edema and peripheral pulses strong  SKIN: 3 separate large thick abrasions on the right anterior shin.  No active bleeding.  Very friable skin with underlying senile eccymosis.  Intact large skin flaps present.     Procedure:  Wound was cleansed with warm water and hibiclens.  Skin flaps were unfolded with sterile .  Wound edges were approximated with widely placed steristrips over each of the 3 separate wounds. Bacitracin applied and area covered with non-adherent bandage and ACE wrap.           ASSESSMENT/PLAN:                                                        ICD-10-CM    1. Abrasion, right lower leg, initial encounter S80.811A WOUND CARE SUPPLIES   2. Fall, initial encounter W19.XXXA CANCELED: Urinalysis ([place])   3. Traumatic ecchymosis of right lower leg, initial encounter S80.11XA    4. Contusion of face, scalp and neck, initial encounter S00.83XA     S00.03XA     S10.93XA      Home care nurse will follow up on wound as well.  Patient Instructions   We are checking a urine test today due to fall history.     Steri strips applied to  better approximate wound edges.   Very thin friable skin, sutures not recommended.    Watch for signs of infection including redness, swelling and purulent dishcarege as well as an increase in pain.    Change bandage daily with bacitracin or sterile aquafor or vaseline.  Cover with telfa/non-adherent bandage and wrap gently with ACE wrap.   Keep leg elevated when sitting.    This will take several weeks to heal.    Follow up if you have any worsening or persistent problems or concerns.        Yashira Jorge PA-C  [facility]

## 2017-06-20 NOTE — PATIENT INSTRUCTIONS
We are checking a urine test today due to fall history.     Steri strips applied to better approximate wound edges.   Very thin friable skin, sutures not recommended.    Watch for signs of infection including redness, swelling and purulent dishcarege as well as an increase in pain.    Change bandage daily with bacitracin or sterile aquafor or vaseline.  Cover with telfa/non-adherent bandage and wrap gently with ACE wrap.   Keep leg elevated when sitting.    This will take several weeks to heal.    Follow up if you have any worsening or persistent problems or concerns.

## 2017-06-20 NOTE — NURSING NOTE
88 year old  Chief Complaint   Patient presents with     Musculoskeletal Problem     pt has laceration on her right knee        Blood pressure 163/86, pulse 85, temperature 97.8  F (36.6  C), temperature source Oral, SpO2 94 %. There is no height or weight on file to calculate BMI.  Patient Active Problem List   Diagnosis     Breast lump     Joint pain, knee     Preventative health care     Hypertension     Memory loss     Myalgia and myositis     Intertrochanteric fracture of right femur (H)     Trochanteric fracture (H)     DVT prophylaxis     Anemia due to blood loss, acute     Candidiasis of vulva and vagina     Physical deconditioning     Spinal stenosis     Closed fracture of left inferior pubic ramus (H)     Chronic idiopathic constipation     Persistent insomnia     Advance Care Planning       Wt Readings from Last 2 Encounters:   04/11/17 148 lb 0.6 oz (67.2 kg)   02/14/17 150 lb (68 kg)     BP Readings from Last 3 Encounters:   06/20/17 163/86   04/11/17 151/88   02/14/17 145/89         Current Outpatient Prescriptions   Medication     solifenacin (VESICARE) 10 MG tablet     Docusate Sodium (COLACE PO)     TRAZODONE HCL PO     venlafaxine (EFFEXOR) 75 MG tablet     lactobacillus rhamnosus, GG, (CULTURELL) capsule     lidocaine (LIDODERM) 5 % patch     polyethylene glycol (MIRALAX/GLYCOLAX) packet     donepezil (ARICEPT) 10 MG tablet     memantine XR (NAMENDA XR) 28 MG capsule     multivitamin, therapeutic with minerals (THERA-VIT-M) TABS     cholecalciferol 1000 UNITS TABS     clonazePAM (KLONOPIN) 0.5 MG tablet     acetaminophen (TYLENOL) 500 MG tablet     No current facility-administered medications for this visit.        Social History   Substance Use Topics     Smoking status: Former Smoker     Quit date: 1/1/1965     Smokeless tobacco: Never Used     Alcohol use Yes      Comment: Occasionally; 3 half sized beers per week       Health Maintenance Due   Topic Date Due     LAILA QUESTIONNAIRE 1 YEAR   02/10/1929     PHQ-9 Q1YR  02/10/1929       No results found for: PAP      June 20, 2017 2:43 PM

## 2017-06-20 NOTE — MR AVS SNAPSHOT
After Visit Summary   6/20/2017    Rosina Arriaza    MRN: [id number]           Patient Information     Date Of Birth          2/9/1929        Visit Information        Provider Department      6/20/2017 2:40 PM Yashira Jorge PA-C [facility]        Today's Diagnoses     Abrasion, right lower leg, initial encounter    -  1    Fall, initial encounter        Traumatic ecchymosis of right lower leg, initial encounter          Care Instructions    We are checking a urine test today due to fall history.     Steri strips applied to better approximate wound edges.   Very thin friable skin, sutures not recommended.    Watch for signs of infection including redness, swelling and purulent dishcarege as well as an increase in pain.    Change bandage daily with bacitracin or sterile aquafor or vaseline.  Cover with telfa/non-adherent bandage and wrap gently with ACE wrap.   Keep leg elevated when sitting.    This will take several weeks to heal.    Follow up if you have any worsening or persistent problems or concerns.              Follow-ups after your visit        Your next 10 appointments already scheduled     Jul 11, 2017  2:40 PM CDT   Return Visit with Minh Oro MD   [facility] ([facility])    [address], [address]  [place] [address]   [phone]              Who to contact     Please call your clinic at [phone] to:    Ask questions about your health    Make or cancel appointments    Discuss your medicines    Learn about your test results    Speak to your doctor   If you have compliments or concerns about an experience at your clinic, or if you wish to file a complaint, please contact [facility] Patient Relations at [phone] or email us at [email or web address]         Additional Information About Your Visit        MyChart Information     [employer] gives you secure access to your  electronic health record. If you see a primary care provider, you can also send messages to your care team and make appointments. If you have questions, please call your primary care clinic.  If you do not have a primary care provider, please call [phone] and they will assist you.      [employer] is an electronic gateway that provides easy, online access to your medical records. With [employer], you can request a clinic appointment, read your test results, renew a prescription or communicate with your care team.     To access your existing account, please contact your [facility] or call [phone] for assistance.        Care EveryWhere ID     This is your Care EveryWhere ID. This could be used by other organizations to access your [place] medical records  [id number]        Your Vitals Were     Pulse Temperature Pulse Oximetry             85 97.8  F (36.6  C) (Oral) 94%          Blood Pressure from Last 3 Encounters:   06/20/17 163/86   04/11/17 151/88   02/14/17 145/89    Weight from Last 3 Encounters:   04/11/17 148 lb 0.6 oz (67.2 kg)   02/14/17 150 lb (68 kg)   02/01/17 153 lb (69.4 kg)              We Performed the Following     Urinalysis ([place])     WOUND CARE SUPPLIES          Today's Medication Changes          These changes are accurate as of: 6/20/17  4:37 PM.  If you have any questions, ask your nurse or doctor.               These medicines have changed or have updated prescriptions.        Dose/Directions    lidocaine 5 % Patch   Commonly known as:  LIDODERM   This may have changed:    - when to take this  - reasons to take this  - additional instructions   Used for:  Pubic ramus fracture, left, closed, initial encounter (H)        Dose:  1-2 patch   Place 1-2 patches onto the skin every 24 hours Apply to left hip.  Patient must be patch free for 12 hours daily.  Patch on for 12 hours/off for 12 hours   Quantity:  30 patch   Refills:  0                Primary Care  Provider Office Phone # Fax #    Minh Oro [phone]       [facility] [address] [place] [id number]        Thank you!     Thank you for choosing [facility]  for your care. Our goal is always to provide you with excellent care. Hearing back from our patients is one way we can continue to improve our services. Please take a few minutes to complete the written survey that you may receive in the mail after your visit with us. Thank you!             Your Updated Medication List - Protect others around you: Learn how to safely use, store and throw away your medicines at www.disposemymeds.org.          This list is accurate as of: 6/20/17  4:37 PM.  Always use your most recent med list.                   Brand Name Dispense Instructions for use    acetaminophen 500 MG tablet    TYLENOL    60 tablet    Take 2 tablets (1,000 mg) by mouth 3 times daily       cholecalciferol 1000 UNITS Tabs     30 tablet    Take 1,000 Units by mouth daily       clonazePAM 0.5 MG tablet    klonoPIN    20 tablet    Take 1.5 tablets (0.75 mg) by mouth At Bedtime       COLACE PO      Take 100 mg by mouth       donepezil 10 MG tablet    ARICEPT    30 tablet    Take 1 tablet (10 mg) by mouth daily       lactobacillus rhamnosus (GG) capsule      Take 1 capsule by mouth daily       lidocaine 5 % Patch    LIDODERM    30 patch    Place 1-2 patches onto the skin every 24 hours Apply to left hip.  Patient must be patch free for 12 hours daily.  Patch on for 12 hours/off for 12 hours       memantine XR 28 MG 24 hr capsule    NAMENDA XR     Take 1 capsule (28 mg) by mouth every morning       multivitamin, therapeutic with minerals Tabs tablet     30 each    Take 1 tablet by mouth daily       polyethylene glycol Packet    MIRALAX/GLYCOLAX    7 packet    Take 17 g by mouth daily       solifenacin 10 MG tablet    VESICARE    90 tablet    Take 1 tablet (10 mg) by mouth daily       TRAZODONE HCL PO      Take   mg, QHS as needed.       venlafaxine 75 MG tablet    EFFEXOR    60 tablet    Take 1 tablet (75 mg) by mouth 2 times daily

## 2017-07-11 ENCOUNTER — OFFICE VISIT (OUTPATIENT)
Dept: FAMILY MEDICINE | Facility: CLINIC | Age: 82
End: 2017-07-11

## 2017-07-11 VITALS
DIASTOLIC BLOOD PRESSURE: 86 MMHG | BODY MASS INDEX: 25.69 KG/M2 | WEIGHT: 145 LBS | TEMPERATURE: 97.5 F | HEART RATE: 101 BPM | OXYGEN SATURATION: 92 % | SYSTOLIC BLOOD PRESSURE: 129 MMHG | HEIGHT: 63 IN

## 2017-07-11 DIAGNOSIS — D62 ANEMIA DUE TO BLOOD LOSS, ACUTE: ICD-10-CM

## 2017-07-11 DIAGNOSIS — S81.811A SKIN TEAR OF RIGHT LOWER LEG WITHOUT COMPLICATION, INITIAL ENCOUNTER: Primary | ICD-10-CM

## 2017-07-11 ASSESSMENT — PAIN SCALES - GENERAL: PAINLEVEL: NO PAIN (0)

## 2017-07-11 NOTE — NURSING NOTE
"88 year old  Chief Complaint   Patient presents with     Follow Up For     3 months check up. Recheck on her Hgb.     Fall     she had a fall and scraped her right knee about a month ago.        Blood pressure 129/86, pulse 101, temperature 97.5  F (36.4  C), height 5' 2.99\" (160 cm), weight 145 lb (65.8 kg), SpO2 92 %. Body mass index is 25.69 kg/(m^2).  Patient Active Problem List   Diagnosis     Breast lump     Joint pain, knee     Preventative health care     Hypertension     Memory loss     Myalgia and myositis     Intertrochanteric fracture of right femur (H)     Trochanteric fracture (H)     DVT prophylaxis     Anemia due to blood loss, acute     Candidiasis of vulva and vagina     Physical deconditioning     Spinal stenosis     Closed fracture of left inferior pubic ramus (H)     Chronic idiopathic constipation     Persistent insomnia     Advance Care Planning       Wt Readings from Last 2 Encounters:   07/11/17 145 lb (65.8 kg)   04/11/17 148 lb 0.6 oz (67.2 kg)     BP Readings from Last 3 Encounters:   07/11/17 129/86   06/20/17 163/86   04/11/17 151/88         Current Outpatient Prescriptions   Medication     solifenacin (VESICARE) 10 MG tablet     Docusate Sodium (COLACE PO)     TRAZODONE HCL PO     venlafaxine (EFFEXOR) 75 MG tablet     lactobacillus rhamnosus, GG, (CULTURELL) capsule     lidocaine (LIDODERM) 5 % patch     polyethylene glycol (MIRALAX/GLYCOLAX) packet     donepezil (ARICEPT) 10 MG tablet     memantine XR (NAMENDA XR) 28 MG capsule     multivitamin, therapeutic with minerals (THERA-VIT-M) TABS     cholecalciferol 1000 UNITS TABS     clonazePAM (KLONOPIN) 0.5 MG tablet     acetaminophen (TYLENOL) 500 MG tablet     No current facility-administered medications for this visit.        Social History   Substance Use Topics     Smoking status: Former Smoker     Quit date: 1/1/1965     Smokeless tobacco: Never Used     Alcohol use Yes      Comment: Occasionally; 3 half sized beers per week "       Health Maintenance Due   Topic Date Due     LAILA QUESTIONNAIRE 1 YEAR  02/10/1929     PHQ-9 Q1YR  02/10/1929       No results found for: GYPSY Butts CMA  July 11, 2017 2:38 PM

## 2017-07-11 NOTE — MR AVS SNAPSHOT
"              After Visit Summary   7/11/2017    Rosina Arriaza    MRN: [id number]           Patient Information     Date Of Birth          2/9/1929        Visit Information        Provider Department      7/11/2017 2:40 PM Minh Oro MD [facility]        Today's Diagnoses     Anemia due to blood loss, acute    -  1       Follow-ups after your visit        Who to contact     Please call your clinic at [phone] to:    Ask questions about your health    Make or cancel appointments    Discuss your medicines    Learn about your test results    Speak to your doctor   If you have compliments or concerns about an experience at your clinic, or if you wish to file a complaint, please contact [facility] Patient Relations at [phone] or email us at [email or web address]         Additional Information About Your Visit        MyChart Information     [employer] gives you secure access to your electronic health record. If you see a primary care provider, you can also send messages to your care team and make appointments. If you have questions, please call your primary care clinic.  If you do not have a primary care provider, please call [phone] and they will assist you.      [employer] is an electronic gateway that provides easy, online access to your medical records. With [employer], you can request a clinic appointment, read your test results, renew a prescription or communicate with your care team.     To access your existing account, please contact your [facility] or call [phone] for assistance.        Care EveryWhere ID     This is your Care EveryWhere ID. This could be used by other organizations to access your [place] medical records  [id number]        Your Vitals Were     Pulse Temperature Height Pulse Oximetry BMI (Body Mass Index)       101 97.5  F (36.4  C) 5' 2.99\" (160 cm) 92% 25.69 kg/m2        Blood Pressure from Last " 3 Encounters:   07/11/17 129/86   06/20/17 163/86   04/11/17 151/88    Weight from Last 3 Encounters:   07/11/17 145 lb (65.8 kg)   04/11/17 148 lb 0.6 oz (67.2 kg)   02/14/17 150 lb (68 kg)              We Performed the Following     Hemoglobin (HGB) ([place])          Today's Medication Changes          These changes are accurate as of: 7/11/17  4:46 PM.  If you have any questions, ask your nurse or doctor.               These medicines have changed or have updated prescriptions.        Dose/Directions    lidocaine 5 % Patch   Commonly known as:  LIDODERM   This may have changed:    - when to take this  - reasons to take this  - additional instructions   Used for:  Pubic ramus fracture, left, closed, initial encounter (H)        Dose:  1-2 patch   Place 1-2 patches onto the skin every 24 hours Apply to left hip.  Patient must be patch free for 12 hours daily.  Patch on for 12 hours/off for 12 hours   Quantity:  30 patch   Refills:  0                Primary Care Provider Office Phone # Fax #    Minh Oro [phone]       [place] [address]        Equal Access to Services     ARLENE VELEZ AH: Hadbrisa Rowan, waaxda luabbey, qaybta kaalmada ademiryam, bartolo mabry. So [facility] [phone].    ATENCIÓN: Si habla español, tiene a villalpando disposición servicios gratuitos de asistencia lingüística. Llame al [phone].    We comply with applicable federal civil rights laws and [place] laws. We do not discriminate on the basis of race, color, national origin, age, disability sex, sexual orientation or gender identity.            Thank you!     Thank you for choosing [facility]  for your care. Our goal is always to provide you with excellent care. Hearing back from our patients is one way we can continue to improve our services. Please take a few minutes to complete the written survey that you may receive in the  mail after your visit with us. Thank you!             Your Updated Medication List - Protect others around you: Learn how to safely use, store and throw away your medicines at www.disposemymeds.org.          This list is accurate as of: 7/11/17  4:46 PM.  Always use your most recent med list.                   Brand Name Dispense Instructions for use Diagnosis    acetaminophen 500 MG tablet    TYLENOL    60 tablet    Take 2 tablets (1,000 mg) by mouth 3 times daily    Pubic ramus fracture, left, closed, initial encounter (H)       cholecalciferol 1000 UNITS Tabs     30 tablet    Take 1,000 Units by mouth daily    Vitamin deficiency       clonazePAM 0.5 MG tablet    klonoPIN    20 tablet    Take 1.5 tablets (0.75 mg) by mouth At Bedtime    Persistent insomnia       COLACE PO      Take 100 mg by mouth        donepezil 10 MG tablet    ARICEPT    30 tablet    Take 1 tablet (10 mg) by mouth daily    Dementia without behavioral disturbance, unspecified dementia type       lactobacillus rhamnosus (GG) capsule      Take 1 capsule by mouth daily    Vitamin deficiency       lidocaine 5 % Patch    LIDODERM    30 patch    Place 1-2 patches onto the skin every 24 hours Apply to left hip.  Patient must be patch free for 12 hours daily.  Patch on for 12 hours/off for 12 hours    Pubic ramus fracture, left, closed, initial encounter (H)       memantine XR 28 MG 24 hr capsule    NAMENDA XR     Take 1 capsule (28 mg) by mouth every morning    Dementia without behavioral disturbance, unspecified dementia type       multivitamin, therapeutic with minerals Tabs tablet     30 each    Take 1 tablet by mouth daily    Vitamin deficiency       polyethylene glycol Packet    MIRALAX/GLYCOLAX    7 packet    Take 17 g by mouth daily    Chronic idiopathic constipation       solifenacin 10 MG tablet    VESICARE    90 tablet    Take 1 tablet (10 mg) by mouth daily    Bladder spasms       TRAZODONE HCL PO      Take  mg, QHS as needed.         venlafaxine 75 MG tablet    EFFEXOR    60 tablet    Take 1 tablet (75 mg) by mouth 2 times daily    Recurrent major depressive disorder, in remission (H)

## 2017-07-12 LAB — HEMOGLOBIN: 12.8 G/DL (ref 11.7–15.7)

## 2017-07-24 NOTE — PROGRESS NOTES
CHIEF COMPLAINT:  Skin tear and history of anemia.    HISTORY OF PRESENT ILLNESS:  Rosina is an 88-year-old accompanied by her son, Dar, to today's visit.  Overall, she is doing quite well.  She is suffering from memory loss and is in a good facility.  Both her son and daughter check on her frequently.  She fell about a month ago and scraped her right shin.  They been trying various ways to protect the skin and have not been that successful.  It is not bleeding but they would love a different strategy.  I will describe my findings below.      Active medical problems:  Reviewed.    Rosina feels fine.  She is in good spirits.  She filled out a PHQ-9, and her total score was 0.  Active medical problems and medications all reviewed.    OBJECTIVE:  Kayla is in absolutely no distress.  Affect upbeat.  She was very appropriate with conversation.  I did not test her short-term memory today.  Speech is articulate.  She and Dar have a great relationship.     VITAL SIGNS: /86 with a pulse of 101, but this slowed down when I did my exam.  Temperature 97.5.  She is 5 feet 3 inches and weighs 145, with a BMI of 25.69.  O2 sats are 92% on room air, but her skin is warm, dry, and there was absolutely no cyanosis.  She is breathing comfortably with a respiratory rate of approximately 10 to 12 breaths per minute.  Weight is 3 pounds less than it was 3 months ago in April.  Examination of her right shin reveals an area of a scraped skin.  Not actively bleeding.  There is no evidence of cellulitis.  She is getting some granuloma formation.  It is a patch approximately 2 and half to 3 inches vertically by an inch and a half to 2 inches horizontally.  This will respond nicely to a Tegaderm patch.  This was applied and I demonstrated how to do this, so they can change it every 3 to 7 days as it peals off.    LABORATORY DATA:  Hemoglobin pending.     ASSESSMENT/PLAN:    1.  Skin tear, right shin, benign and healing well.   They just had not found a good way to dress it.  Tegaderm should be all that is necessary at this point, it is not weeping and there is absolutely no active bleeding.  Additional Tegaderm is to take-home were given to the family.    2.  History of anemia.  Hemoglobin pending.  Results will be shared with them as soon as it is available.  Call with any problems or questions.  I look for to seeing her on an as-needed basis at this point.        Minh Oro MD    D:  07/21/2017 17:46 T:  07/24/2017 08:22  Document:  [id number] \RK

## 2017-07-25 ASSESSMENT — PATIENT HEALTH QUESTIONNAIRE - PHQ9: SUM OF ALL RESPONSES TO PHQ QUESTIONS 1-9: 0

## 2017-10-19 ENCOUNTER — DOCUMENTATION ONLY (OUTPATIENT)
Dept: OTHER | Facility: CLINIC | Age: 82
End: 2017-10-19

## 2017-10-19 DIAGNOSIS — Z71.89 ADVANCED DIRECTIVES, COUNSELING/DISCUSSION: Chronic | ICD-10-CM

## 2017-10-22 ENCOUNTER — NURSE TRIAGE (OUTPATIENT)
Dept: NURSING | Facility: CLINIC | Age: 82
End: 2017-10-22

## 2017-10-24 ENCOUNTER — OFFICE VISIT (OUTPATIENT)
Dept: FAMILY MEDICINE | Facility: CLINIC | Age: 82
End: 2017-10-24

## 2017-10-24 DIAGNOSIS — G30.1 LATE ONSET ALZHEIMER'S DISEASE WITHOUT BEHAVIORAL DISTURBANCE (H): Primary | ICD-10-CM

## 2017-10-24 DIAGNOSIS — S81.812A NONINFECTED SKIN TEAR OF LEFT LOWER EXTREMITY, INITIAL ENCOUNTER: ICD-10-CM

## 2017-10-24 DIAGNOSIS — F02.80 LATE ONSET ALZHEIMER'S DISEASE WITHOUT BEHAVIORAL DISTURBANCE (H): Primary | ICD-10-CM

## 2017-10-24 DIAGNOSIS — I10 ESSENTIAL HYPERTENSION: ICD-10-CM

## 2017-10-24 DIAGNOSIS — R30.0 DYSURIA: ICD-10-CM

## 2017-10-24 NOTE — PROGRESS NOTES
SUBJECTIVE:   Rosina Arriaza is a 88 year old female who presents to clinic today for the following health issues:    URINARY TRACT Concerns: Rosina who has a known history of Alzheimer's disease with worsening memory loss and confusion, presents with her daughter who is concerned that her mom may have a urinary tract infection. Rosina has had an occasional UTI in the past with minimal symptoms. Daughter is concerned because patient fell 3 days ago sustaining a mild abrasion to her left lower leg. She was concerned that this was representative of worsening confusion causing the fall. Rosina is in a safe memory care facility and her family members are very involved in her care.    Rosina denies any urinary symptoms such as dysuria, urinary frequency and urgency. She denies a fever and no back pain. She feels well and has had a normal appetite. Both Rosina and her daughter feel she has returned to her normal mental status. The incident noted above happened in the evening and they are now wondering if she was experiencing some sun downing.    There have been no changes to Rosina's medications.   The wound to her left lower leg is healing well. They are addressing with antibiotic ointment and a bandage. There is no associated pain or purulent discharge.      Description:   Painful urination (Dysuria): no   Blood in urine (Hematuria): no   Delay in urine (Hesitency): no     Intensity: 0/10    Progression of Symptoms:  none    Accompanying Signs & Symptoms:  Fever/chills: no   Flank pain no   Nausea and vomiting: no   Any vaginal symptoms: none  Abdominal/Pelvic Pain: no     History:   History of frequent UTI's: no   History of kidney stones: no   Sexually Active: no   Possibility of pregnancy: No    Therapies Tried and outcome: none    Hypertension:  Blood pressure was notably elevated in clinic today improved on recheck. She currently is not taking any medication for high blood pressure and her past  blood pressure readings have been in a good range. See below. She denies any symptoms associated with elevated blood pressure including headache, blurred vision, chest pain, heart palpitations and pedal edema. She is notably anxious in clinic today and upset that she is not able to leave a urine specimen For evaluation of above.    Problem list and histories reviewed & adjusted, as indicated.  Additional history: as documented    Patient Active Problem List   Diagnosis     Breast lump     Joint pain, knee     Hypertension     Memory loss     Myalgia and myositis     Intertrochanteric fracture of right femur (H)     Trochanteric fracture (H)     DVT prophylaxis     Anemia due to blood loss, acute     Candidiasis of vulva and vagina     Physical deconditioning     Spinal stenosis     Closed fracture of left inferior pubic ramus (H)     Chronic idiopathic constipation     Persistent insomnia     Advance Care Planning     Past Surgical History:   Procedure Laterality Date     ARTHROPLASTY HIP Right 12/18/2014    Procedure: ARTHROPLASTY HIP;  Surgeon: Tej March MD;  Location:  OR       Social History   Substance Use Topics     Smoking status: Former Smoker     Quit date: 1/1/1965     Smokeless tobacco: Never Used     Alcohol use Yes      Comment: Occasionally; 3 half sized beers per week     Family History   Problem Relation Age of Onset     Cancer - colorectal Mother          Current Outpatient Prescriptions   Medication     solifenacin (VESICARE) 10 MG tablet     Docusate Sodium (COLACE PO)     TRAZODONE HCL PO     venlafaxine (EFFEXOR) 75 MG tablet     lactobacillus rhamnosus, GG, (CULTURELL) capsule     lidocaine (LIDODERM) 5 % patch     polyethylene glycol (MIRALAX/GLYCOLAX) packet     donepezil (ARICEPT) 10 MG tablet     memantine XR (NAMENDA XR) 28 MG capsule     multivitamin, therapeutic with minerals (THERA-VIT-M) TABS     cholecalciferol 1000 UNITS TABS     clonazePAM (KLONOPIN) 0.5 MG tablet      acetaminophen (TYLENOL) 500 MG tablet     No current facility-administered medications for this visit.      BP Readings from Last 6 Encounters:   10/24/17 160/89   07/11/17 129/86   06/20/17 163/86   04/11/17 151/88   02/14/17 145/89   02/01/17 147/78       Reviewed and updated as needed this visit by clinical staffTobacco  Allergies  Meds  Problems       Reviewed and updated as needed this visit by Provider  Tobacco  Allergies  Meds  Problems       ROS:  Constitutional, HEENT, cardiovascular, pulmonary, gi and gu systems are negative, except as otherwise noted.      OBJECTIVE:   /89 (BP Location: Left arm, Patient Position: Chair, Cuff Size: Adult Regular)  Pulse 72  Temp 97.4  F (36.3  C) (Oral)  Wt 147 lb (66.7 kg)  SpO2 97%  BMI 26.05 kg/m2  Body mass index is 26.05 kg/(m^2).  GENERAL: healthy, alert and no distress  NECK: no adenopathy, no asymmetry, masses, or scars and thyroid normal to palpation  RESP: lungs clear to auscultation - no rales, rhonchi or wheezes  CV: regular rate and rhythm, normal S1 S2, no S3 or S4, no murmur, click or rub, no peripheral edema and peripheral pulses strong  ABDOMEN: soft, nontender, no hepatosplenomegaly, no masses and bowel sounds normal  SKIN: Left mid shin reveals a healing abrasion and skin tear. No sign of bruising, swelling and no purulent discharge for palpable tenderness.  BACK: no CVA tenderness, no paralumbar tenderness      ASSESSMENT/PLAN:         ICD-10-CM    1. Late onset Alzheimer's disease without behavioral disturbance G30.1     F02.80    2. Dysuria R30.0 Routine UA with micro reflex to culture     Urine Culture Aerobic Bacterial     CANCELED: Urinalysis ([place])     CANCELED: Urine Culture Aerobic Bacterial   3. Essential hypertension I10    4. Noninfected skin tear of left lower extremity, initial encounter S81.812A      The patient tried repeatedly to leave a urine specimen she was not able. Discussed with patient and her daughter  that I am reluctant to place patient on antibiotics without a clear reason. And since she is back to her normal level of cognition I recommended we monitor the situation. Family was sent home with a urine container and they can try to collect specimen at home and bring it in if they had continued worries. The patient spoke with the lab person for appropriate management of this.    Left leg wound appears to be healing well. Continue daily bandage changes with antibiotic ointment and nonstick bandage.    Follow-up with any worsening or persistent problems or concerns.      Yashira Jorge PA-C  [facility]

## 2017-10-24 NOTE — MR AVS SNAPSHOT
After Visit Summary   10/24/2017    Rosina Arriaza    MRN: [id number]           Patient Information     Date Of Birth          2/9/1929        Visit Information        Provider Department      10/24/2017 3:40 PM Yashira Jorge PA-C [facility]        Today's Diagnoses     Late onset Alzheimer's disease without behavioral disturbance    -  1    Dysuria        Essential hypertension        Noninfected skin tear of left lower extremity, initial encounter           Follow-ups after your visit        Future tests that were ordered for you today     Open Future Orders        Priority Expected Expires Ordered    Routine UA with micro reflex to culture Routine  10/24/2018 10/24/2017    Urine Culture Aerobic Bacterial Routine  10/24/2018 10/24/2017            Who to contact     Please call your clinic at [phone] to:    Ask questions about your health    Make or cancel appointments    Discuss your medicines    Learn about your test results    Speak to your doctor   If you have compliments or concerns about an experience at your clinic, or if you wish to file a complaint, please contact [facility] Patient Relations at [phone] or email us at [email or web address]         Additional Information About Your Visit        MyChart Information     Bilibot gives you secure access to your electronic health record. If you see a primary care provider, you can also send messages to your care team and make appointments. If you have questions, please call your primary care clinic.  If you do not have a primary care provider, please call [phone] and they will assist you.      Bilibot is an electronic gateway that provides easy, online access to your medical records. With Bilibot, you can request a clinic appointment, read your test results, renew a prescription or communicate with your care team.     To access your existing account, please contact your [place]  [facility] or call [phone] for assistance.        Care EveryWhere ID     This is your Care EveryWhere ID. This could be used by other organizations to access your [facility] medical records  [id number]        Your Vitals Were     Pulse Temperature Pulse Oximetry BMI (Body Mass Index)          72 97.4  F (36.3  C) (Oral) 97% 26.05 kg/m2         Blood Pressure from Last 3 Encounters:   10/24/17 160/89   07/11/17 129/86   06/20/17 163/86    Weight from Last 3 Encounters:   10/24/17 147 lb (66.7 kg)   07/11/17 145 lb (65.8 kg)   04/11/17 148 lb 0.6 oz (67.2 kg)                 Today's Medication Changes          These changes are accurate as of: 10/24/17 11:59 PM.  If you have any questions, ask your nurse or doctor.               These medicines have changed or have updated prescriptions.        Dose/Directions    lidocaine 5 % Patch   Commonly known as:  LIDODERM   This may have changed:    - when to take this  - reasons to take this  - additional instructions   Used for:  Pubic ramus fracture, left, closed, initial encounter (H)        Dose:  1-2 patch   Place 1-2 patches onto the skin every 24 hours Apply to left hip.  Patient must be patch free for 12 hours daily.  Patch on for 12 hours/off for 12 hours   Quantity:  30 patch   Refills:  0                Primary Care Provider Office Phone # Fax #    Minh Oro [phone]       [address] [place] [address]        Equal Access to Services     ARLENE VELEZ AH: Hadii yamileth rileyo Soazucenaali, waaxda luqadaha, qaybta kaalmada pariyada, bartolo rodriguez . So [facility] [phone].    ATENCIÓN: Si habla español, tiene a villalpando disposición servicios gratuitos de asistencia lingüística. Llame al [phone].    We comply with applicable federal civil rights laws and [place] laws. We do not discriminate on the basis of race, color, national origin, age, disability, sex, sexual orientation, or  gender identity.            Thank you!     Thank you for choosing [facility]  for your care. Our goal is always to provide you with excellent care. Hearing back from our patients is one way we can continue to improve our services. Please take a few minutes to complete the written survey that you may receive in the mail after your visit with us. Thank you!             Your Updated Medication List - Protect others around you: Learn how to safely use, store and throw away your medicines at www.disposemymeds.org.          This list is accurate as of: 10/24/17 11:59 PM.  Always use your most recent med list.                   Brand Name Dispense Instructions for use Diagnosis    acetaminophen 500 MG tablet    TYLENOL    60 tablet    Take 2 tablets (1,000 mg) by mouth 3 times daily    Pubic ramus fracture, left, closed, initial encounter (H)       cholecalciferol 1000 UNITS Tabs     30 tablet    Take 1,000 Units by mouth daily    Vitamin deficiency       clonazePAM 0.5 MG tablet    klonoPIN    20 tablet    Take 1.5 tablets (0.75 mg) by mouth At Bedtime    Persistent insomnia       COLACE PO      Take 100 mg by mouth        donepezil 10 MG tablet    ARICEPT    30 tablet    Take 1 tablet (10 mg) by mouth daily    Dementia without behavioral disturbance, unspecified dementia type       lactobacillus rhamnosus (GG) capsule      Take 1 capsule by mouth daily    Vitamin deficiency       lidocaine 5 % Patch    LIDODERM    30 patch    Place 1-2 patches onto the skin every 24 hours Apply to left hip.  Patient must be patch free for 12 hours daily.  Patch on for 12 hours/off for 12 hours    Pubic ramus fracture, left, closed, initial encounter (H)       memantine XR 28 MG 24 hr capsule    NAMENDA XR     Take 1 capsule (28 mg) by mouth every morning    Dementia without behavioral disturbance, unspecified dementia type       multivitamin, therapeutic with minerals Tabs tablet     30 each    Take 1 tablet by mouth daily     Vitamin deficiency       polyethylene glycol Packet    MIRALAX/GLYCOLAX    7 packet    Take 17 g by mouth daily    Chronic idiopathic constipation       solifenacin 10 MG tablet    VESICARE    90 tablet    Take 1 tablet (10 mg) by mouth daily    Bladder spasms       TRAZODONE HCL PO      Take  mg, QHS as needed.        venlafaxine 75 MG tablet    EFFEXOR    60 tablet    Take 1 tablet (75 mg) by mouth 2 times daily    Recurrent major depressive disorder, in remission (H)

## 2017-10-25 VITALS
TEMPERATURE: 97.4 F | DIASTOLIC BLOOD PRESSURE: 89 MMHG | SYSTOLIC BLOOD PRESSURE: 160 MMHG | HEART RATE: 72 BPM | WEIGHT: 147 LBS | OXYGEN SATURATION: 97 % | BODY MASS INDEX: 26.05 KG/M2

## 2017-10-26 ENCOUNTER — TELEPHONE (OUTPATIENT)
Dept: FAMILY MEDICINE | Facility: CLINIC | Age: 82
End: 2017-10-26

## 2017-10-26 DIAGNOSIS — R30.0 DYSURIA: ICD-10-CM

## 2017-10-26 DIAGNOSIS — N30.00 ACUTE CYSTITIS WITHOUT HEMATURIA: Primary | ICD-10-CM

## 2017-10-26 LAB
ALBUMIN UR-MCNC: 30 MG/DL
APPEARANCE UR: CLEAR
BACTERIA #/AREA URNS HPF: ABNORMAL /HPF
BILIRUB UR QL STRIP: NEGATIVE
COLOR UR AUTO: YELLOW
GLUCOSE UR STRIP-MCNC: NEGATIVE MG/DL
HGB UR QL STRIP: ABNORMAL
KETONES UR STRIP-MCNC: NEGATIVE MG/DL
LEUKOCYTE ESTERASE UR QL STRIP: ABNORMAL
NITRATE UR QL: POSITIVE
NON-SQ EPI CELLS #/AREA URNS LPF: ABNORMAL /LPF
PH UR STRIP: 6 PH (ref 5–7)
RBC #/AREA URNS AUTO: ABNORMAL /HPF
SOURCE: ABNORMAL
SP GR UR STRIP: 1.02 (ref 1–1.03)
UROBILINOGEN UR STRIP-ACNC: 0.2 EU/DL (ref 0.2–1)
WBC #/AREA URNS AUTO: ABNORMAL /HPF

## 2017-10-26 PROCEDURE — 87088 URINE BACTERIA CULTURE: CPT | Performed by: PHYSICIAN ASSISTANT

## 2017-10-26 PROCEDURE — 81001 URINALYSIS AUTO W/SCOPE: CPT | Performed by: PHYSICIAN ASSISTANT

## 2017-10-26 PROCEDURE — 87086 URINE CULTURE/COLONY COUNT: CPT | Performed by: PHYSICIAN ASSISTANT

## 2017-10-26 PROCEDURE — 87186 SC STD MICRODIL/AGAR DIL: CPT | Performed by: PHYSICIAN ASSISTANT

## 2017-10-26 RX ORDER — SULFAMETHOXAZOLE/TRIMETHOPRIM 800-160 MG
1 TABLET ORAL 2 TIMES DAILY
Qty: 14 TABLET | Refills: 0 | Status: SHIPPED | OUTPATIENT
Start: 2017-10-26 | End: 2017-11-02

## 2017-10-26 NOTE — TELEPHONE ENCOUNTER
Phone call placed to daughter Natalie Arriaza listed as the  for patient. There was some concern for urinary tract infection when I saw patient two days ago. But she was unable to leave a urine specimen. They brought urine in is positive for nitrites and sites urine culture currently pending. Message left with daughter that antibiotic will be sent to the patient's listed pharmacy that a urine culture be done and we will call any changes need to be made to the treatment plan. Strongly encouraged the rain his drinking as much water as possible any worsening persistent problems or concerns.  Kasia Jorge PA-C  5:01 PM  10/26/17

## 2017-10-28 LAB
BACTERIA SPEC CULT: ABNORMAL
SPECIMEN SOURCE: ABNORMAL

## 2017-11-08 DIAGNOSIS — R30.0 DYSURIA: Primary | ICD-10-CM

## 2017-11-10 DIAGNOSIS — R30.0 DYSURIA: ICD-10-CM

## 2017-11-10 LAB
ALBUMIN UR-MCNC: NEGATIVE MG/DL
APPEARANCE UR: CLEAR
BILIRUB UR QL STRIP: NEGATIVE
COLOR UR AUTO: YELLOW
GLUCOSE UR STRIP-MCNC: NEGATIVE MG/DL
HGB UR QL STRIP: NEGATIVE
KETONES UR STRIP-MCNC: NEGATIVE MG/DL
LEUKOCYTE ESTERASE UR QL STRIP: ABNORMAL
NITRATE UR QL: NEGATIVE
NON-SQ EPI CELLS #/AREA URNS LPF: NORMAL /LPF
PH UR STRIP: 5.5 PH (ref 5–7)
RBC #/AREA URNS AUTO: NORMAL /HPF
SOURCE: ABNORMAL
SP GR UR STRIP: 1.01 (ref 1–1.03)
UROBILINOGEN UR STRIP-ACNC: 0.2 EU/DL (ref 0.2–1)
WBC #/AREA URNS AUTO: NORMAL /HPF

## 2017-11-10 PROCEDURE — 87086 URINE CULTURE/COLONY COUNT: CPT | Performed by: FAMILY MEDICINE

## 2017-11-10 PROCEDURE — 81001 URINALYSIS AUTO W/SCOPE: CPT | Performed by: FAMILY MEDICINE

## 2017-11-11 LAB
BACTERIA SPEC CULT: NORMAL
SPECIMEN SOURCE: NORMAL

## 2017-12-27 DIAGNOSIS — R35.0 URINARY FREQUENCY: Primary | ICD-10-CM

## 2017-12-29 ENCOUNTER — TELEPHONE (OUTPATIENT)
Dept: FAMILY MEDICINE | Facility: CLINIC | Age: 82
End: 2017-12-29

## 2017-12-29 DIAGNOSIS — R35.0 URINARY FREQUENCY: ICD-10-CM

## 2017-12-29 DIAGNOSIS — N39.0 URINARY TRACT INFECTION: Primary | ICD-10-CM

## 2017-12-29 DIAGNOSIS — R82.90 NONSPECIFIC FINDING ON EXAMINATION OF URINE: Primary | ICD-10-CM

## 2017-12-29 LAB
ALBUMIN UR-MCNC: NEGATIVE MG/DL
APPEARANCE UR: ABNORMAL
BACTERIA #/AREA URNS HPF: ABNORMAL /HPF
BILIRUB UR QL STRIP: NEGATIVE
COLOR UR AUTO: YELLOW
GLUCOSE UR STRIP-MCNC: NEGATIVE MG/DL
HGB UR QL STRIP: NEGATIVE
KETONES UR STRIP-MCNC: NEGATIVE MG/DL
LEUKOCYTE ESTERASE UR QL STRIP: ABNORMAL
NITRATE UR QL: POSITIVE
NON-SQ EPI CELLS #/AREA URNS LPF: ABNORMAL /LPF
PH UR STRIP: 5.5 PH (ref 5–7)
RBC #/AREA URNS AUTO: ABNORMAL /HPF
SOURCE: ABNORMAL
SP GR UR STRIP: 1.01 (ref 1–1.03)
UROBILINOGEN UR STRIP-ACNC: 0.2 EU/DL (ref 0.2–1)
WBC #/AREA URNS AUTO: ABNORMAL /HPF

## 2017-12-29 PROCEDURE — 87186 SC STD MICRODIL/AGAR DIL: CPT | Performed by: FAMILY MEDICINE

## 2017-12-29 PROCEDURE — 81001 URINALYSIS AUTO W/SCOPE: CPT | Performed by: FAMILY MEDICINE

## 2017-12-29 PROCEDURE — 87086 URINE CULTURE/COLONY COUNT: CPT | Performed by: FAMILY MEDICINE

## 2017-12-29 PROCEDURE — 87088 URINE BACTERIA CULTURE: CPT | Performed by: FAMILY MEDICINE

## 2017-12-29 RX ORDER — SULFAMETHOXAZOLE/TRIMETHOPRIM 800-160 MG
1 TABLET ORAL 2 TIMES DAILY
Qty: 14 TABLET | Refills: 0 | Status: SHIPPED | OUTPATIENT
Start: 2017-12-29

## 2017-12-29 NOTE — TELEPHONE ENCOUNTER
Daughter Natalie calling in today to check on result of her Mom's urine specimen.  They dropped it off today at the [facility] lab.  She had noted a  change in behavior lately, like her Mom had with last UTI in Oct. No other symptoms really. They Mycharted us with this info, and hence why we put in the order for the UA/UC with sensitivities.  Culture is not back yet,  But the initial UA points to an infection again.  Last one in Oct was e. coli, as daughter states Mom has difficulty with loose stools and hygiene about the area.  Common occurrence , I told her.    Preethi notified of UA results  He will order Bactrim DS, as had 2 months ago,  and it was sensitive to it then and she responded well to it , with a clear UA after treatment.     Script sent in to preferred pharmacy and Natalie will pick it up and begin the treatment today.     Told Natalie to call back on Tuesday and we should have the C & S done by then and we can make sure the bug is sensitive to the Bactrim DS.  If not, will change antibiotic.     Also told her if Mom gets worse, fever, obtunded, etc then take to ER to be cared for.  Natalie agrees to plan     Clari White RN  December 29, 2017 3:42 PM

## 2017-12-31 LAB
BACTERIA SPEC CULT: ABNORMAL
SPECIMEN SOURCE: ABNORMAL

## 2018-01-02 DIAGNOSIS — N39.0 FREQUENT UTI: Primary | ICD-10-CM

## 2018-01-02 RX ORDER — NITROFURANTOIN 25; 75 MG/1; MG/1
CAPSULE ORAL
Qty: 30 CAPSULE | Refills: 2 | Status: SHIPPED | OUTPATIENT
Start: 2018-01-02

## 2018-02-24 ENCOUNTER — RECORDS - HEALTHEAST (OUTPATIENT)
Dept: LAB | Facility: CLINIC | Age: 83
End: 2018-02-24

## 2018-02-26 LAB
25(OH)D3 SERPL-MCNC: 35.7 NG/ML (ref 30–80)
ANION GAP SERPL CALCULATED.3IONS-SCNC: 10 MMOL/L (ref 5–18)
BUN SERPL-MCNC: 21 MG/DL (ref 8–28)
CALCIUM SERPL-MCNC: 9.7 MG/DL (ref 8.5–10.5)
CHLORIDE BLD-SCNC: 106 MMOL/L (ref 98–107)
CO2 SERPL-SCNC: 25 MMOL/L (ref 22–31)
CREAT SERPL-MCNC: 1.14 MG/DL (ref 0.6–1.1)
ERYTHROCYTE [DISTWIDTH] IN BLOOD BY AUTOMATED COUNT: 12.7 % (ref 11–14.5)
GFR SERPL CREATININE-BSD FRML MDRD: 45 ML/MIN/1.73M2
GLUCOSE BLD-MCNC: 123 MG/DL (ref 70–125)
HCT VFR BLD AUTO: 40 % (ref 35–47)
HGB BLD-MCNC: 13.1 G/DL (ref 12–16)
MCH RBC QN AUTO: 34.3 PG (ref 27–34)
MCHC RBC AUTO-ENTMCNC: 32.8 G/DL (ref 32–36)
MCV RBC AUTO: 105 FL (ref 80–100)
PLATELET # BLD AUTO: 278 THOU/UL (ref 140–440)
PMV BLD AUTO: 9.7 FL (ref 8.5–12.5)
POTASSIUM BLD-SCNC: 4.2 MMOL/L (ref 3.5–5)
RBC # BLD AUTO: 3.82 MILL/UL (ref 3.8–5.4)
SODIUM SERPL-SCNC: 141 MMOL/L (ref 136–145)
TSH SERPL DL<=0.005 MIU/L-ACNC: 1.65 UIU/ML (ref 0.3–5)
VIT B12 SERPL-MCNC: 1105 PG/ML (ref 213–816)
WBC: 6.5 THOU/UL (ref 4–11)

## 2018-04-18 ENCOUNTER — RECORDS - HEALTHEAST (OUTPATIENT)
Dept: LAB | Facility: CLINIC | Age: 83
End: 2018-04-18

## 2018-04-18 LAB
ALBUMIN UR-MCNC: ABNORMAL MG/DL
APPEARANCE UR: CLEAR
BACTERIA #/AREA URNS HPF: ABNORMAL HPF
BILIRUB UR QL STRIP: NEGATIVE
COLOR UR AUTO: YELLOW
GLUCOSE UR STRIP-MCNC: NEGATIVE MG/DL
HGB UR QL STRIP: NEGATIVE
KETONES UR STRIP-MCNC: NEGATIVE MG/DL
LEUKOCYTE ESTERASE UR QL STRIP: NEGATIVE
MUCOUS THREADS #/AREA URNS LPF: ABNORMAL LPF
NITRATE UR QL: NEGATIVE
PH UR STRIP: 5.5 [PH] (ref 4.5–8)
RBC #/AREA URNS AUTO: ABNORMAL HPF
SP GR UR STRIP: 1.02 (ref 1–1.03)
SQUAMOUS #/AREA URNS AUTO: ABNORMAL LPF
UROBILINOGEN UR STRIP-ACNC: ABNORMAL
WBC #/AREA URNS AUTO: ABNORMAL HPF

## 2018-05-01 ENCOUNTER — RECORDS - HEALTHEAST (OUTPATIENT)
Dept: LAB | Facility: CLINIC | Age: 83
End: 2018-05-01

## 2018-05-01 LAB
ANION GAP SERPL CALCULATED.3IONS-SCNC: 11 MMOL/L (ref 5–18)
BUN SERPL-MCNC: 24 MG/DL (ref 8–28)
CALCIUM SERPL-MCNC: 9.8 MG/DL (ref 8.5–10.5)
CHLORIDE BLD-SCNC: 105 MMOL/L (ref 98–107)
CO2 SERPL-SCNC: 23 MMOL/L (ref 22–31)
CREAT SERPL-MCNC: 1.09 MG/DL (ref 0.6–1.1)
GFR SERPL CREATININE-BSD FRML MDRD: 47 ML/MIN/1.73M2
GLUCOSE BLD-MCNC: 174 MG/DL (ref 70–125)
POTASSIUM BLD-SCNC: 4.3 MMOL/L (ref 3.5–5)
SODIUM SERPL-SCNC: 139 MMOL/L (ref 136–145)

## 2018-06-11 ENCOUNTER — RECORDS - HEALTHEAST (OUTPATIENT)
Dept: LAB | Facility: CLINIC | Age: 83
End: 2018-06-11

## 2018-06-11 LAB
ALBUMIN UR-MCNC: NEGATIVE MG/DL
APPEARANCE UR: CLEAR
BILIRUB UR QL STRIP: NEGATIVE
COLOR UR AUTO: YELLOW
GLUCOSE UR STRIP-MCNC: NEGATIVE MG/DL
HGB UR QL STRIP: NEGATIVE
KETONES UR STRIP-MCNC: NEGATIVE MG/DL
LEUKOCYTE ESTERASE UR QL STRIP: NEGATIVE
NITRATE UR QL: NEGATIVE
PH UR STRIP: 6.5 [PH] (ref 4.5–8)
SP GR UR STRIP: 1.01 (ref 1–1.03)
UROBILINOGEN UR STRIP-ACNC: NORMAL

## 2018-07-13 ENCOUNTER — RECORDS - HEALTHEAST (OUTPATIENT)
Dept: LAB | Facility: CLINIC | Age: 83
End: 2018-07-13

## 2018-07-13 LAB
ALBUMIN UR-MCNC: NEGATIVE MG/DL
APPEARANCE UR: CLEAR
BILIRUB UR QL STRIP: NEGATIVE
COLOR UR AUTO: YELLOW
GLUCOSE UR STRIP-MCNC: NEGATIVE MG/DL
HGB UR QL STRIP: NEGATIVE
KETONES UR STRIP-MCNC: NEGATIVE MG/DL
LEUKOCYTE ESTERASE UR QL STRIP: NEGATIVE
NITRATE UR QL: NEGATIVE
PH UR STRIP: 6 [PH] (ref 4.5–8)
SP GR UR STRIP: 1.01 (ref 1–1.03)
UROBILINOGEN UR STRIP-ACNC: NORMAL

## 2018-08-15 ENCOUNTER — RECORDS - HEALTHEAST (OUTPATIENT)
Dept: LAB | Facility: CLINIC | Age: 83
End: 2018-08-15

## 2018-08-15 LAB
ALBUMIN UR-MCNC: NEGATIVE MG/DL
APPEARANCE UR: CLEAR
BACTERIA #/AREA URNS HPF: NORMAL HPF
BILIRUB UR QL STRIP: NEGATIVE
COLOR UR AUTO: YELLOW
GLUCOSE UR STRIP-MCNC: NEGATIVE MG/DL
HGB UR QL STRIP: NEGATIVE
KETONES UR STRIP-MCNC: NEGATIVE MG/DL
LEUKOCYTE ESTERASE UR QL STRIP: NEGATIVE
NITRATE UR QL: NEGATIVE
PH UR STRIP: 7 [PH] (ref 4.5–8)
RBC #/AREA URNS AUTO: NORMAL HPF
SP GR UR STRIP: 1.01 (ref 1–1.03)
SQUAMOUS #/AREA URNS AUTO: NORMAL LPF
UROBILINOGEN UR STRIP-ACNC: NORMAL
WBC #/AREA URNS AUTO: NORMAL HPF

## 2018-10-03 ENCOUNTER — RECORDS - HEALTHEAST (OUTPATIENT)
Dept: LAB | Facility: CLINIC | Age: 83
End: 2018-10-03

## 2018-10-03 LAB
ANION GAP SERPL CALCULATED.3IONS-SCNC: 8 MMOL/L (ref 5–18)
BUN SERPL-MCNC: 25 MG/DL (ref 8–28)
CALCIUM SERPL-MCNC: 9.8 MG/DL (ref 8.5–10.5)
CHLORIDE BLD-SCNC: 107 MMOL/L (ref 98–107)
CO2 SERPL-SCNC: 25 MMOL/L (ref 22–31)
CREAT SERPL-MCNC: 1.04 MG/DL (ref 0.6–1.1)
ERYTHROCYTE [DISTWIDTH] IN BLOOD BY AUTOMATED COUNT: 12.5 % (ref 11–14.5)
GFR SERPL CREATININE-BSD FRML MDRD: 50 ML/MIN/1.73M2
GLUCOSE BLD-MCNC: 96 MG/DL (ref 70–125)
HCT VFR BLD AUTO: 39 % (ref 35–47)
HGB BLD-MCNC: 12.7 G/DL (ref 12–16)
MCH RBC QN AUTO: 33.9 PG (ref 27–34)
MCHC RBC AUTO-ENTMCNC: 32.6 G/DL (ref 32–36)
MCV RBC AUTO: 104 FL (ref 80–100)
PLATELET # BLD AUTO: 202 THOU/UL (ref 140–440)
PMV BLD AUTO: 9.7 FL (ref 8.5–12.5)
POTASSIUM BLD-SCNC: 4.4 MMOL/L (ref 3.5–5)
RBC # BLD AUTO: 3.75 MILL/UL (ref 3.8–5.4)
SODIUM SERPL-SCNC: 140 MMOL/L (ref 136–145)
TSH SERPL DL<=0.005 MIU/L-ACNC: 1.76 UIU/ML (ref 0.3–5)
WBC: 5.1 THOU/UL (ref 4–11)

## 2018-11-15 ENCOUNTER — RECORDS - HEALTHEAST (OUTPATIENT)
Dept: LAB | Facility: CLINIC | Age: 83
End: 2018-11-15

## 2018-11-15 LAB
ALBUMIN UR-MCNC: ABNORMAL MG/DL
APPEARANCE UR: CLEAR
BACTERIA #/AREA URNS HPF: ABNORMAL HPF
BILIRUB UR QL STRIP: NEGATIVE
COLOR UR AUTO: YELLOW
GLUCOSE UR STRIP-MCNC: NEGATIVE MG/DL
HGB UR QL STRIP: NEGATIVE
KETONES UR STRIP-MCNC: NEGATIVE MG/DL
LEUKOCYTE ESTERASE UR QL STRIP: NEGATIVE
MUCOUS THREADS #/AREA URNS LPF: ABNORMAL LPF
NITRATE UR QL: NEGATIVE
PH UR STRIP: 6.5 [PH] (ref 4.5–8)
RBC #/AREA URNS AUTO: ABNORMAL HPF
SP GR UR STRIP: 1.02 (ref 1–1.03)
SQUAMOUS #/AREA URNS AUTO: ABNORMAL LPF
UROBILINOGEN UR STRIP-ACNC: ABNORMAL
WBC #/AREA URNS AUTO: ABNORMAL HPF

## 2019-03-07 ENCOUNTER — RECORDS - HEALTHEAST (OUTPATIENT)
Dept: LAB | Facility: CLINIC | Age: 84
End: 2019-03-07

## 2019-03-07 LAB
ALBUMIN UR-MCNC: ABNORMAL MG/DL
APPEARANCE UR: CLEAR
BACTERIA #/AREA URNS HPF: ABNORMAL HPF
BILIRUB UR QL STRIP: NEGATIVE
COLOR UR AUTO: YELLOW
GLUCOSE UR STRIP-MCNC: NEGATIVE MG/DL
HGB UR QL STRIP: NEGATIVE
KETONES UR STRIP-MCNC: NEGATIVE MG/DL
LEUKOCYTE ESTERASE UR QL STRIP: NEGATIVE
MUCOUS THREADS #/AREA URNS LPF: ABNORMAL LPF
NITRATE UR QL: NEGATIVE
PH UR STRIP: 6.5 [PH] (ref 4.5–8)
RBC #/AREA URNS AUTO: ABNORMAL HPF
SP GR UR STRIP: 1.01 (ref 1–1.03)
SQUAMOUS #/AREA URNS AUTO: ABNORMAL LPF
UROBILINOGEN UR STRIP-ACNC: ABNORMAL
WBC #/AREA URNS AUTO: ABNORMAL HPF

## 2019-03-26 ENCOUNTER — RECORDS - HEALTHEAST (OUTPATIENT)
Dept: LAB | Facility: CLINIC | Age: 84
End: 2019-03-26

## 2019-03-26 LAB
25(OH)D3 SERPL-MCNC: 31.5 NG/ML (ref 30–80)
ALBUMIN SERPL-MCNC: 3.3 G/DL (ref 3.5–5)
ALP SERPL-CCNC: 63 U/L (ref 45–120)
ALT SERPL W P-5'-P-CCNC: 14 U/L (ref 0–45)
ANION GAP SERPL CALCULATED.3IONS-SCNC: 8 MMOL/L (ref 5–18)
AST SERPL W P-5'-P-CCNC: 14 U/L (ref 0–40)
BILIRUB SERPL-MCNC: 0.2 MG/DL (ref 0–1)
BUN SERPL-MCNC: 29 MG/DL (ref 8–28)
CALCIUM SERPL-MCNC: 9.5 MG/DL (ref 8.5–10.5)
CHLORIDE BLD-SCNC: 108 MMOL/L (ref 98–107)
CO2 SERPL-SCNC: 25 MMOL/L (ref 22–31)
CREAT SERPL-MCNC: 1.09 MG/DL (ref 0.6–1.1)
ERYTHROCYTE [DISTWIDTH] IN BLOOD BY AUTOMATED COUNT: 13.1 % (ref 11–14.5)
GFR SERPL CREATININE-BSD FRML MDRD: 47 ML/MIN/1.73M2
GLUCOSE BLD-MCNC: 109 MG/DL (ref 70–125)
HCT VFR BLD AUTO: 39.1 % (ref 35–47)
HGB BLD-MCNC: 12.7 G/DL (ref 12–16)
MCH RBC QN AUTO: 33.3 PG (ref 27–34)
MCHC RBC AUTO-ENTMCNC: 32.5 G/DL (ref 32–36)
MCV RBC AUTO: 103 FL (ref 80–100)
PLATELET # BLD AUTO: 181 THOU/UL (ref 140–440)
PMV BLD AUTO: 10 FL (ref 8.5–12.5)
POTASSIUM BLD-SCNC: 4.8 MMOL/L (ref 3.5–5)
PROT SERPL-MCNC: 6.3 G/DL (ref 6–8)
RBC # BLD AUTO: 3.81 MILL/UL (ref 3.8–5.4)
SODIUM SERPL-SCNC: 141 MMOL/L (ref 136–145)
TSH SERPL DL<=0.005 MIU/L-ACNC: 1.18 UIU/ML (ref 0.3–5)
VIT B12 SERPL-MCNC: 727 PG/ML (ref 213–816)
WBC: 7.3 THOU/UL (ref 4–11)

## 2019-07-23 ENCOUNTER — RECORDS - HEALTHEAST (OUTPATIENT)
Dept: LAB | Facility: CLINIC | Age: 84
End: 2019-07-23

## 2019-07-23 LAB
ALBUMIN UR-MCNC: ABNORMAL MG/DL
APPEARANCE UR: CLEAR
BACTERIA #/AREA URNS HPF: ABNORMAL HPF
BILIRUB UR QL STRIP: NEGATIVE
COLOR UR AUTO: YELLOW
GLUCOSE UR STRIP-MCNC: NEGATIVE MG/DL
HGB UR QL STRIP: NEGATIVE
KETONES UR STRIP-MCNC: NEGATIVE MG/DL
LEUKOCYTE ESTERASE UR QL STRIP: NEGATIVE
MUCOUS THREADS #/AREA URNS LPF: ABNORMAL LPF
NITRATE UR QL: NEGATIVE
PH UR STRIP: 6 [PH] (ref 4.5–8)
RBC #/AREA URNS AUTO: ABNORMAL HPF
SP GR UR STRIP: 1.01 (ref 1–1.03)
SQUAMOUS #/AREA URNS AUTO: ABNORMAL LPF
TRANS CELLS #/AREA URNS HPF: ABNORMAL LPF
UROBILINOGEN UR STRIP-ACNC: ABNORMAL
WBC #/AREA URNS AUTO: ABNORMAL HPF

## 2019-08-01 ENCOUNTER — RECORDS - HEALTHEAST (OUTPATIENT)
Dept: LAB | Facility: CLINIC | Age: 84
End: 2019-08-01

## 2019-08-01 LAB
ALBUMIN UR-MCNC: ABNORMAL MG/DL
ANION GAP SERPL CALCULATED.3IONS-SCNC: 9 MMOL/L (ref 5–18)
APPEARANCE UR: CLEAR
BACTERIA #/AREA URNS HPF: ABNORMAL HPF
BILIRUB UR QL STRIP: NEGATIVE
BNP SERPL-MCNC: 32 PG/ML (ref 0–167)
BUN SERPL-MCNC: 27 MG/DL (ref 8–28)
CALCIUM SERPL-MCNC: 10.2 MG/DL (ref 8.5–10.5)
CHLORIDE BLD-SCNC: 106 MMOL/L (ref 98–107)
CO2 SERPL-SCNC: 26 MMOL/L (ref 22–31)
COLOR UR AUTO: YELLOW
CREAT SERPL-MCNC: 1.03 MG/DL (ref 0.6–1.1)
ERYTHROCYTE [DISTWIDTH] IN BLOOD BY AUTOMATED COUNT: 12.9 % (ref 11–14.5)
GFR SERPL CREATININE-BSD FRML MDRD: 50 ML/MIN/1.73M2
GLUCOSE BLD-MCNC: 100 MG/DL (ref 70–125)
GLUCOSE UR STRIP-MCNC: NEGATIVE MG/DL
HCT VFR BLD AUTO: 40.2 % (ref 35–47)
HGB BLD-MCNC: 12.9 G/DL (ref 12–16)
HGB UR QL STRIP: NEGATIVE
HYALINE CASTS #/AREA URNS LPF: ABNORMAL LPF
KETONES UR STRIP-MCNC: NEGATIVE MG/DL
LEUKOCYTE ESTERASE UR QL STRIP: ABNORMAL
MCH RBC QN AUTO: 33.5 PG (ref 27–34)
MCHC RBC AUTO-ENTMCNC: 32.1 G/DL (ref 32–36)
MCV RBC AUTO: 104 FL (ref 80–100)
MUCOUS THREADS #/AREA URNS LPF: ABNORMAL LPF
NITRATE UR QL: NEGATIVE
PH UR STRIP: 6 [PH] (ref 4.5–8)
PLATELET # BLD AUTO: 197 THOU/UL (ref 140–440)
PMV BLD AUTO: 10.1 FL (ref 8.5–12.5)
POTASSIUM BLD-SCNC: 4.3 MMOL/L (ref 3.5–5)
RBC # BLD AUTO: 3.85 MILL/UL (ref 3.8–5.4)
RBC #/AREA URNS AUTO: ABNORMAL HPF
SODIUM SERPL-SCNC: 141 MMOL/L (ref 136–145)
SP GR UR STRIP: 1.02 (ref 1–1.03)
SQUAMOUS #/AREA URNS AUTO: ABNORMAL LPF
TRANS CELLS #/AREA URNS HPF: ABNORMAL LPF
UROBILINOGEN UR STRIP-ACNC: ABNORMAL
WBC #/AREA URNS AUTO: ABNORMAL HPF
WBC: 5.3 THOU/UL (ref 4–11)

## 2019-08-02 LAB — BACTERIA SPEC CULT: NO GROWTH

## 2019-09-28 ENCOUNTER — HEALTH MAINTENANCE LETTER (OUTPATIENT)
Age: 84
End: 2019-09-28

## 2019-12-27 ENCOUNTER — RECORDS - HEALTHEAST (OUTPATIENT)
Dept: LAB | Facility: CLINIC | Age: 84
End: 2019-12-27

## 2019-12-27 LAB
ANION GAP SERPL CALCULATED.3IONS-SCNC: 7 MMOL/L (ref 5–18)
BUN SERPL-MCNC: 32 MG/DL (ref 8–28)
CALCIUM SERPL-MCNC: 9.7 MG/DL (ref 8.5–10.5)
CHLORIDE BLD-SCNC: 108 MMOL/L (ref 98–107)
CO2 SERPL-SCNC: 25 MMOL/L (ref 22–31)
CREAT SERPL-MCNC: 1.1 MG/DL (ref 0.6–1.1)
GFR SERPL CREATININE-BSD FRML MDRD: 47 ML/MIN/1.73M2
GLUCOSE BLD-MCNC: 98 MG/DL (ref 70–125)
POTASSIUM BLD-SCNC: 4.5 MMOL/L (ref 3.5–5)
SODIUM SERPL-SCNC: 140 MMOL/L (ref 136–145)

## 2020-03-15 ENCOUNTER — HEALTH MAINTENANCE LETTER (OUTPATIENT)
Age: 85
End: 2020-03-15

## 2021-01-10 ENCOUNTER — HEALTH MAINTENANCE LETTER (OUTPATIENT)
Age: 86
End: 2021-01-10

## 2021-05-08 ENCOUNTER — HEALTH MAINTENANCE LETTER (OUTPATIENT)
Age: 86
End: 2021-05-08

## 2021-10-23 ENCOUNTER — HEALTH MAINTENANCE LETTER (OUTPATIENT)
Age: 86
End: 2021-10-23

## 2024-06-17 PROBLEM — Z71.89 ADVANCED DIRECTIVES, COUNSELING/DISCUSSION: Status: RESOLVED | Noted: 2017-05-19 | Resolved: 2024-06-17
